# Patient Record
Sex: MALE | NOT HISPANIC OR LATINO | Employment: FULL TIME | URBAN - METROPOLITAN AREA
[De-identification: names, ages, dates, MRNs, and addresses within clinical notes are randomized per-mention and may not be internally consistent; named-entity substitution may affect disease eponyms.]

---

## 2017-01-17 ENCOUNTER — OFFICE VISIT (OUTPATIENT)
Dept: INTERNAL MEDICINE | Facility: CLINIC | Age: 22
End: 2017-01-17
Payer: COMMERCIAL

## 2017-01-17 VITALS
HEIGHT: 64 IN | OXYGEN SATURATION: 98 % | BODY MASS INDEX: 28.15 KG/M2 | TEMPERATURE: 97.1 F | SYSTOLIC BLOOD PRESSURE: 112 MMHG | WEIGHT: 164.9 LBS | DIASTOLIC BLOOD PRESSURE: 70 MMHG | RESPIRATION RATE: 16 BRPM | HEART RATE: 86 BPM

## 2017-01-17 DIAGNOSIS — R63.1 POLYDIPSIA: ICD-10-CM

## 2017-01-17 DIAGNOSIS — R35.89 POLYURIA: Primary | ICD-10-CM

## 2017-01-17 DIAGNOSIS — F41.1 ANXIETY STATE: ICD-10-CM

## 2017-01-17 DIAGNOSIS — K21.9 GASTROESOPHAGEAL REFLUX DISEASE WITHOUT ESOPHAGITIS: ICD-10-CM

## 2017-01-17 DIAGNOSIS — J06.9 VIRAL URI: ICD-10-CM

## 2017-01-17 LAB
ALBUMIN SERPL-MCNC: 4.3 G/DL (ref 3.4–5)
ALBUMIN UR-MCNC: NEGATIVE MG/DL
ALP SERPL-CCNC: 75 U/L (ref 40–150)
ALT SERPL W P-5'-P-CCNC: 32 U/L (ref 0–70)
ANION GAP SERPL CALCULATED.3IONS-SCNC: 8 MMOL/L (ref 3–14)
APPEARANCE UR: CLEAR
AST SERPL W P-5'-P-CCNC: 16 U/L (ref 0–45)
BILIRUB SERPL-MCNC: 0.4 MG/DL (ref 0.2–1.3)
BILIRUB UR QL STRIP: NEGATIVE
BUN SERPL-MCNC: 9 MG/DL (ref 7–30)
CALCIUM SERPL-MCNC: 9.4 MG/DL (ref 8.5–10.1)
CHLORIDE SERPL-SCNC: 103 MMOL/L (ref 94–109)
CO2 SERPL-SCNC: 30 MMOL/L (ref 20–32)
COLOR UR AUTO: YELLOW
CREAT SERPL-MCNC: 0.88 MG/DL (ref 0.66–1.25)
ERYTHROCYTE [DISTWIDTH] IN BLOOD BY AUTOMATED COUNT: 11.7 % (ref 10–15)
GFR SERPL CREATININE-BSD FRML MDRD: NORMAL ML/MIN/1.7M2
GLUCOSE SERPL-MCNC: 91 MG/DL (ref 70–99)
GLUCOSE UR STRIP-MCNC: NEGATIVE MG/DL
HBA1C MFR BLD: 5.1 % (ref 4.3–6)
HCT VFR BLD AUTO: 48.1 % (ref 40–53)
HGB BLD-MCNC: 16.5 G/DL (ref 13.3–17.7)
HGB UR QL STRIP: NEGATIVE
KETONES UR STRIP-MCNC: NEGATIVE MG/DL
LEUKOCYTE ESTERASE UR QL STRIP: NEGATIVE
MCH RBC QN AUTO: 30.6 PG (ref 26.5–33)
MCHC RBC AUTO-ENTMCNC: 34.3 G/DL (ref 31.5–36.5)
MCV RBC AUTO: 89 FL (ref 78–100)
NITRATE UR QL: NEGATIVE
PH UR STRIP: 6.5 PH (ref 5–7)
PLATELET # BLD AUTO: 310 10E9/L (ref 150–450)
POTASSIUM SERPL-SCNC: 4.3 MMOL/L (ref 3.4–5.3)
PROT SERPL-MCNC: 7.8 G/DL (ref 6.8–8.8)
RBC # BLD AUTO: 5.39 10E12/L (ref 4.4–5.9)
SODIUM SERPL-SCNC: 141 MMOL/L (ref 133–144)
SP GR UR STRIP: 1.02 (ref 1–1.03)
URN SPEC COLLECT METH UR: NORMAL
UROBILINOGEN UR STRIP-ACNC: 0.2 EU/DL (ref 0.2–1)
WBC # BLD AUTO: 5.2 10E9/L (ref 4–11)

## 2017-01-17 PROCEDURE — 83036 HEMOGLOBIN GLYCOSYLATED A1C: CPT | Performed by: INTERNAL MEDICINE

## 2017-01-17 PROCEDURE — 80053 COMPREHEN METABOLIC PANEL: CPT | Performed by: INTERNAL MEDICINE

## 2017-01-17 PROCEDURE — 36415 COLL VENOUS BLD VENIPUNCTURE: CPT | Performed by: INTERNAL MEDICINE

## 2017-01-17 PROCEDURE — 99214 OFFICE O/P EST MOD 30 MIN: CPT | Performed by: INTERNAL MEDICINE

## 2017-01-17 PROCEDURE — 81003 URINALYSIS AUTO W/O SCOPE: CPT | Performed by: INTERNAL MEDICINE

## 2017-01-17 PROCEDURE — 85027 COMPLETE CBC AUTOMATED: CPT | Performed by: INTERNAL MEDICINE

## 2017-01-17 NOTE — PATIENT INSTRUCTIONS
You can reach your [facility] Care Team any time of the day by calling [phone].  This number will put you in touch with the 24 hour nurse line even if the clinic is closed.  The clinic hours for [facility] are:  Monday through Thursday 7am to 6pm   Friday 7am to 5pm   Saturday 8am to 12p for Pediatrics only.    To  contact your  please call [phone].  This is a direct number for your care team during clinic hours.    [facility] is now open for your convenience:  Monday through Friday 7:30am to 7pm  Saturday and Sunday 9am to 3pm  They are closed on all major holidays.

## 2017-01-17 NOTE — NURSING NOTE
"Chief Complaint   Patient presents with     Mouth/Lip Problem     patient feels he may have thrush, tongue coated white.     Consult     excessive thirst, drinking 5 liters H@O everyday.     Cyst     right lower leg cyst.       Initial /70 mmHg  Pulse 86  Temp(Src) 97.1  F (36.2  C) (Oral)  Resp 16  Ht 5' 4\" (1.626 m)  Wt 164 lb 14.4 oz (74.798 kg)  BMI 28.29 kg/m2  SpO2 98% Estimated body mass index is 28.29 kg/(m^2) as calculated from the following:    Height as of this encounter: 5' 4\" (1.626 m).    Weight as of this encounter: 164 lb 14.4 oz (74.798 kg).  BP completed using cuff size: regular    "

## 2017-01-17 NOTE — PROGRESS NOTES
"  SUBJECTIVE:                                                    Jonas Hogan is a 21 year old male who presents to clinic today for the following health issues:  1.) mouth and lip/ feels he has thrush.  2.) GERD  3.) Consult regarding daily excessive thirst.    Patient is seen for a follow up visit.  Concern for recent URI, had sore throat, cough, nasal congestion, mild HA. No fever. No SOB, CP. Improving. Had symptoms for 5 days.  Has h/o GERD on PPI  treatment. symptoms are controlled. No nausea, vomiting, heartburns, bloating.  Has h/o anxiety. On psychiatric medications. Controlled.   Concern for chronic excessive thirst, urine frequency. No h/o DM.       Problem list and histories reviewed & adjusted, as indicated.  Additional history: none    Problem list, Medication list, Allergies, and Medical/Social/Surgical histories reviewed in EPIC and updated as appropriate.    ROS:  Constitutional, HEENT, cardiovascular, pulmonary, gi and gu systems are negative, except as otherwise noted.    OBJECTIVE:                                                    /70 mmHg  Pulse 86  Temp(Src) 97.1  F (36.2  C) (Oral)  Resp 16  Ht 5' 4\" (1.626 m)  Wt 164 lb 14.4 oz (74.798 kg)  BMI 28.29 kg/m2  SpO2 98%  Body mass index is 28.29 kg/(m^2).  GENERAL: healthy, alert and no distress  HEENT: ANDREA, clear mucosa, whitish coated tongue, mild nasal congestion   NECK: no adenopathy, no asymmetry, masses, or scars and thyroid normal to palpation  RESP: lungs clear to auscultation - no rales, rhonchi or wheezes  CV: regular rate and rhythm, normal S1 S2, no S3 or S4, no murmur, click or rub, no peripheral edema and peripheral pulses strong  ABDOMEN: soft, nontender, no hepatosplenomegaly, no masses and bowel sounds normal  MS: no gross musculoskeletal defects noted, no edema         ASSESSMENT/PLAN:                                                      Problem List Items Addressed This Visit     Anxiety state    " Gastroesophageal reflux disease without esophagitis      Other Visit Diagnoses     Polyuria    -  Primary     Relevant Orders     *UA reflex to Microscopic and Culture ([facility] (except [place] and [place]) (Completed)     CBC with platelets (Completed)     Comprehensive metabolic panel (Completed)     Hemoglobin A1c (Completed)     Polydipsia         Relevant Orders     *UA reflex to Microscopic and Culture ([facility] and [facility] (except [place] and [place]) (Completed)     CBC with platelets (Completed)     Comprehensive metabolic panel (Completed)     Hemoglobin A1c (Completed)     Viral URI                ASSESS LAB   Cont treatment   URI, likely viral, improving   Assess for DM, possible medication related side effects     Follow-Up: with results     Preet Syed MD  [facility]

## 2017-03-14 ENCOUNTER — TELEPHONE (OUTPATIENT)
Dept: INTERNAL MEDICINE | Facility: CLINIC | Age: 22
End: 2017-03-14

## 2017-03-14 ENCOUNTER — APPOINTMENT (OUTPATIENT)
Dept: CT IMAGING | Facility: CLINIC | Age: 22
End: 2017-03-14
Attending: EMERGENCY MEDICINE
Payer: COMMERCIAL

## 2017-03-14 ENCOUNTER — HOSPITAL ENCOUNTER (EMERGENCY)
Facility: CLINIC | Age: 22
Discharge: HOME | End: 2017-03-14
Attending: EMERGENCY MEDICINE | Admitting: EMERGENCY MEDICINE
Payer: COMMERCIAL

## 2017-03-14 VITALS
DIASTOLIC BLOOD PRESSURE: 95 MMHG | HEART RATE: 80 BPM | HEIGHT: 66 IN | BODY MASS INDEX: 25.39 KG/M2 | RESPIRATION RATE: 20 BRPM | WEIGHT: 158 LBS | SYSTOLIC BLOOD PRESSURE: 135 MMHG | OXYGEN SATURATION: 100 % | TEMPERATURE: 98.6 F

## 2017-03-14 DIAGNOSIS — R10.31 ABDOMINAL PAIN, RIGHT LOWER QUADRANT: ICD-10-CM

## 2017-03-14 DIAGNOSIS — K59.00 CONSTIPATION, UNSPECIFIED CONSTIPATION TYPE: ICD-10-CM

## 2017-03-14 LAB
ALBUMIN UR-MCNC: NEGATIVE MG/DL
ANION GAP SERPL CALCULATED.3IONS-SCNC: 9 MMOL/L (ref 3–14)
APPEARANCE UR: CLEAR
BILIRUB UR QL STRIP: NEGATIVE
BUN SERPL-MCNC: 11 MG/DL (ref 7–30)
CALCIUM SERPL-MCNC: 9 MG/DL (ref 8.5–10.1)
CHLORIDE SERPL-SCNC: 102 MMOL/L (ref 94–109)
CO2 SERPL-SCNC: 28 MMOL/L (ref 20–32)
COLOR UR AUTO: COLORLESS
CREAT SERPL-MCNC: 0.92 MG/DL (ref 0.66–1.25)
ERYTHROCYTE [DISTWIDTH] IN BLOOD BY AUTOMATED COUNT: 11.1 % (ref 10–15)
GFR SERPL CREATININE-BSD FRML MDRD: NORMAL ML/MIN/1.7M2
GLUCOSE SERPL-MCNC: 91 MG/DL (ref 70–99)
GLUCOSE UR STRIP-MCNC: NEGATIVE MG/DL
HCT VFR BLD AUTO: 44.2 % (ref 40–53)
HGB BLD-MCNC: 15.5 G/DL (ref 13.3–17.7)
HGB UR QL STRIP: NEGATIVE
KETONES UR STRIP-MCNC: NEGATIVE MG/DL
LEUKOCYTE ESTERASE UR QL STRIP: NEGATIVE
LIPASE SERPL-CCNC: 169 U/L (ref 73–393)
MCH RBC QN AUTO: 31.3 PG (ref 26.5–33)
MCHC RBC AUTO-ENTMCNC: 35.1 G/DL (ref 31.5–36.5)
MCV RBC AUTO: 89 FL (ref 78–100)
MUCOUS THREADS #/AREA URNS LPF: PRESENT /LPF
NITRATE UR QL: NEGATIVE
PH UR STRIP: 7 PH (ref 5–7)
PLATELET # BLD AUTO: 313 10E9/L (ref 150–450)
POTASSIUM SERPL-SCNC: 3.8 MMOL/L (ref 3.4–5.3)
RBC # BLD AUTO: 4.96 10E12/L (ref 4.4–5.9)
RBC #/AREA URNS AUTO: <1 /HPF (ref 0–2)
SODIUM SERPL-SCNC: 139 MMOL/L (ref 133–144)
SP GR UR STRIP: 1 (ref 1–1.03)
URN SPEC COLLECT METH UR: ABNORMAL
UROBILINOGEN UR STRIP-MCNC: 0 MG/DL (ref 0–2)
WBC # BLD AUTO: 7.9 10E9/L (ref 4–11)
WBC #/AREA URNS AUTO: 1 /HPF (ref 0–2)

## 2017-03-14 PROCEDURE — 25000128 H RX IP 250 OP 636: Performed by: EMERGENCY MEDICINE

## 2017-03-14 PROCEDURE — 96375 TX/PRO/DX INJ NEW DRUG ADDON: CPT

## 2017-03-14 PROCEDURE — 83690 ASSAY OF LIPASE: CPT | Performed by: EMERGENCY MEDICINE

## 2017-03-14 PROCEDURE — 80048 BASIC METABOLIC PNL TOTAL CA: CPT | Performed by: EMERGENCY MEDICINE

## 2017-03-14 PROCEDURE — 99285 EMERGENCY DEPT VISIT HI MDM: CPT | Mod: 25

## 2017-03-14 PROCEDURE — 81001 URINALYSIS AUTO W/SCOPE: CPT | Performed by: EMERGENCY MEDICINE

## 2017-03-14 PROCEDURE — 96361 HYDRATE IV INFUSION ADD-ON: CPT

## 2017-03-14 PROCEDURE — 85027 COMPLETE CBC AUTOMATED: CPT | Performed by: EMERGENCY MEDICINE

## 2017-03-14 PROCEDURE — 96374 THER/PROPH/DIAG INJ IV PUSH: CPT

## 2017-03-14 PROCEDURE — 74176 CT ABD & PELVIS W/O CONTRAST: CPT

## 2017-03-14 RX ORDER — KETOROLAC TROMETHAMINE 15 MG/ML
15 INJECTION, SOLUTION INTRAMUSCULAR; INTRAVENOUS ONCE
Status: COMPLETED | OUTPATIENT
Start: 2017-03-14 | End: 2017-03-14

## 2017-03-14 RX ORDER — POLYETHYLENE GLYCOL 3350 17 G/17G
POWDER, FOR SOLUTION ORAL
Qty: 527 G | Refills: 0 | Status: SHIPPED | OUTPATIENT
Start: 2017-03-14 | End: 2022-12-29

## 2017-03-14 RX ORDER — ONDANSETRON 2 MG/ML
8 INJECTION INTRAMUSCULAR; INTRAVENOUS ONCE
Status: DISCONTINUED | OUTPATIENT
Start: 2017-03-14 | End: 2017-03-14 | Stop reason: HOSPADM

## 2017-03-14 RX ADMIN — KETOROLAC TROMETHAMINE 15 MG: 15 INJECTION, SOLUTION INTRAMUSCULAR; INTRAVENOUS at 18:35

## 2017-03-14 RX ADMIN — SODIUM CHLORIDE 1000 ML: 9 INJECTION, SOLUTION INTRAVENOUS at 18:35

## 2017-03-14 ASSESSMENT — ENCOUNTER SYMPTOMS
FEVER: 0
ABDOMINAL PAIN: 1
NAUSEA: 1
VOMITING: 0
DIARRHEA: 0
CONSTIPATION: 1

## 2017-03-14 NOTE — ED AVS SNAPSHOT
[facility]    [address] [facility] [address]    Phone:  [phone]    Fax:  [phone]                                       Jonas Hogan   MRN: [id number]    Department:  [facility]   Date of Visit:  3/14/2017           Patient Information     Date Of Birth          1995        Your diagnoses for this visit were:     Constipation, unspecified constipation type     Abdominal pain, right lower quadrant        You were seen by Tong Oliveira MD.      Follow-up Information     Follow up with Preet Syed MD. Schedule an appointment as soon as possible for a visit in 1 week.    Specialty:  Internal Medicine    Why:  As needed    Contact information:    [facility]  [address] [facility] [address]  [phone]          Discharge Instructions       Discharge Instructions  Constipation  Your doctor has diagnosed you with constipation. Constipation can cause severe cramping pain and your physician thinks this is the cause of your abdominal pain today.  People usually recognize that they are constipated because they have difficulty having bowel movements, are not having bowel movements frequently enough, or are not having large enough bowel movements. Sometimes, especially in children or older people, you do not recognize that you are constipated until it becomes severe. The most common cause of constipation is a combination of lack of exercise and not eating enough fruits, vegetables and whole grains. Constipation can also be a side effect of medications, such as narcotics, or may be caused by a disease of the digestive system.    Return to the Emergency Department if:    Your abdominal pain worsens or does not improve after a bowel movement.    You become very weak.    You get an oral temperature above 102oF or as directed by your doctor.    You have blood in your stools (bright red or  black, tarry stools).    You keep throwing up or can t drink liquids.    Your see blood when you throw up.    Your stomach gets bloated or bigger.    You have new symptoms or anything that worries you.    What can I do to help myself?    If your doctor gave you a cathartic medication, like magnesium citrate or GoLytely  (polyethylene glycol), you can expect to have cramps and gas pains after taking it. You can expect to have a number of bowel movements and even diarrhea in the course of clearing your bowels.  You will know your bowels have been cleaned out after you pass clear liquid. The cramps and gas should let up after you have emptied your bowels. You may want to wait until morning to take this type of medication so you aren t up in the night.     Sometimes instead of cathartics, we recommend laxatives like milk of magnesia to move your bowels more slowly, or an enema to help the bowels to move. Read and follow the package directions, or follow your physician s instructions.    Once you have become very constipated, it takes time for your bowels to return to normal and you need to be very careful to prevent becoming constipated again. Take a laxative if you don t move your bowels at least every two days.       Eat foods that have a lot of fiber. Good choices are fruits, vegetables, prune juice, apple juice and high fiber cereal. Limit dairy products such as milk and cheese, since these can make constipation worse.     Drink plenty of water and other fluids.     When you feel the need to go to the bathroom, go to the bathroom. Don t hold it.    Miralax , Metamucil , Colace , Senna or fiber supplements can be used daily.  Miralax  daily is often the best choice for children.    FOLLOW UP WITH YOUR REGULAR DOCTOR IF YOUR CONSTIPATION IS NOT IMPROVING.  Sometimes, chronic constipation requires further testing to determine the cause. If you are over 50 years old, you may need a colonoscopy if you have not had one  before.   If you were given a prescription for medicine here today, be sure to read all of the information (including the package insert) that comes with your prescription.  This will include important information about the medicine, its side effects, and any warnings that you need to know about.  The pharmacist who fills the prescription can provide more information and answer questions you may have about the medicine.  If you have questions or concerns that the pharmacist cannot address, please call or return to the Emergency Department.   Discharge Instructions  Abdominal Pain    Abdominal pain can be caused by many things. Your evaluation today does not show the exact cause for your pain. Your doctor today has decided that it is unlikely your pain is due to a life threatening problem, or a problem requiring surgery or hospital admission. Sometimes those problems cannot be found right away, so it is very important that you follow up as directed.  Sometimes only the changes which occur over time allow the cause of your pain to be found.    Return to the Emergency Department for a recheck in 8-12 hours if your pain continues.  If your pain gets worse, changes in location, or feels different, return to the Emergency Department right away.    ADULTS:  Return to the Emergency Department right away if:      You get an oral temperature above 102oF or as directed by your doctor.    You have blood in your stools (bright red or black, tarry stools).    You keep throwing up or can t drink liquids.    You see blood when you throw up.    You can t have a bowel movement or you can t pass gas.    Your stomach gets bloated or bigger.    Your skin or the whites of your eyes look yellow.    You faint.    You have bloody, frequent or painful urination.    You have new symptoms or anything that worries you.    CHILDREN:  Return to the Emergency Department right away if your child has any of the above-listed symptoms or the  following:      Pushes your hand away or screams/cries when his/her belly is touched.    You notice your child is very fussy or weak.    Your child is very tired and is too tired to eat or drink.    Your child is dehydrated.  Signs of dehydration can be:  o Your infant has had no wet diapers in 4-5 hours.  o Your older child has not passed urine in 6-8 hours.  o Your infant or child starts to have dry mouth and lips, or no saliva or tears.    PREGNANT WOMEN:  Return to the Emergency Department right away if you have any of the above-listed symptoms or the following:      You have bleeding, leaking fluid or passing tissue from the vagina.    You have worse pain or cramping, or pain in your shoulder or back.    You have vomiting that will not stop.    You have painful or bloody urination.    You have a temperature of 100oF or more.    Your baby is not moving as much as usual.    You faint.    You get a bad headache with or without eye problems and abdominal pain.    You have a convulsion or seizure.    You have unusual discharge from your vagina and abdominal pain.    Abdominal pain is pretty common during pregnancy.  Your pain may or may not be related to your pregnancy. You should follow-up closely with your OB doctor so they can evaluate you and your baby.  Until you follow-up with your regular doctor, do the following:       Avoid sex and do not put anything in your vagina.    Drink clear fluids.    Only take medications approved by your doctor.    MORE INFORMATION:    Appendicitis:  A possible cause of abdominal pain in any person who still has their appendix is acute appendicitis. Appendicitis is often hard to diagnose.  Testing does not always rule out early appendicitis or other causes of abdominal pain. Close follow-up with your doctor and re-evaluations may be needed to figure out the reason for your abdominal pain.    Follow-up:  It is very important that you make an appointment with your clinic and go to  "the appointment.  If you do not follow-up with your primary doctor, it may result in missing an important development which could result in permanent injury or disability and/or lasting pain.  If there is any problem keeping your appointment, call your doctor or return to the Emergency Department.    Medications:  Take your medications as directed by your doctor today.  Before using over-the-counter medications, ask your doctor and make sure to take the medications as directed.  If you have any questions about medications, ask your doctor.    Diet:  Resume your normal diet as much as possible, but do not eat fried, fatty or spicy foods while you have pain.  Do not drink alcohol or have caffeine.  Do not smoke tobacco.    Probiotics: If you have been given an antibiotic, you may want to also take a probiotic pill or eat yogurt with live cultures. Probiotics have \"good bacteria\" to help your intestines stay healthy. Studies have shown that probiotics help prevent diarrhea and other intestine problems (including C. diff infection) when you take antibiotics. You can buy these without a prescription in the pharmacy section of the store.     If you were given a prescription for medicine here today, be sure to read all of the information (including the package insert) that comes with your prescription.  This will include important information about the medicine, its side effects, and any warnings that you need to know about.  The pharmacist who fills the prescription can provide more information and answer questions you may have about the medicine.  If you have questions or concerns that the pharmacist cannot address, please call or return to the Emergency Department.             24 Hour Appointment Hotline       To make an appointment at any [facility] call [phone] ([phone]). If you don't have a family doctor or clinic, we will help you find one. [facility] are conveniently located to serve the " needs of you and your family.             Review of your medicines      START taking        Dose / Directions Last dose taken    polyethylene glycol powder   Commonly known as:  MIRALAX   Quantity:  527 g        17 grams PO TID until loose stool then 17 grams PO daily PRN constipation   Refills:  0          Our records show that you are taking the medicines listed below. If these are incorrect, please call your family doctor or clinic.        Dose / Directions Last dose taken    CITALOPRAM HYDROBROMIDE PO        Refills:  0        CONCERTA 54 MG CR tablet   Dose:  54 mg   Quantity:  30 tablet   Generic drug:  methylphenidate ER        Take 1 tablet (54 mg) by mouth every morning   Refills:  0        HYDROXYZINE HCL PO        Refills:  0        mirtazapine 15 MG tablet   Commonly known as:  REMERON   Dose:  45 mg        Take 45 mg by mouth At Bedtime   Refills:  0        PRILOSEC OTC PO   Dose:  20 mg        Take 20 mg by mouth   Refills:  0                Prescriptions were sent or printed at these locations (1 Prescription)                   Other Prescriptions                Printed at Department/Unit printer (1 of 1)         polyethylene glycol (MIRALAX) powder                Procedures and tests performed during your visit     Abd/pelvis CT no contrast - [facility] Protocol    Basic metabolic panel (BMP)    CBC (platelets, no diff)    Lipase    UA with Microscopic      Orders Needing Specimen Collection     None      Pending Results     No orders found from 3/12/2017 to 3/15/2017.            Pending Culture Results     No orders found from 3/12/2017 to 3/15/2017.             Test Results from your hospital stay     3/14/2017  6:54 PM - Interface, [employer] Results      Component Results     Component Value Ref Range & Units Status    Color Urine Colorless  Final    Appearance Urine Clear  Final    Glucose Urine Negative NEG mg/dL Final    Bilirubin Urine Negative NEG Final    Ketones Urine Negative NEG mg/dL Final     Specific Gravity Urine 1.003 1.003 - 1.035 Final    Blood Urine Negative NEG Final    pH Urine 7.0 5.0 - 7.0 pH Final    Protein Albumin Urine Negative NEG mg/dL Final    Urobilinogen mg/dL 0.0 0.0 - 2.0 mg/dL Final    Nitrite Urine Negative NEG Final    Leukocyte Esterase Urine Negative NEG Final    Source Midstream Urine  Final    WBC Urine 1 0 - 2 /HPF Final    RBC Urine <1 0 - 2 /HPF Final    Mucous Urine Present (A) NEG /LPF Final         3/14/2017  6:14 PM - Interface, [employer] Results      Component Results     Component Value Ref Range & Units Status    WBC 7.9 4.0 - 11.0 10e9/L Final    RBC Count 4.96 4.4 - 5.9 10e12/L Final    Hemoglobin 15.5 13.3 - 17.7 g/dL Final    Hematocrit 44.2 40.0 - 53.0 % Final    MCV 89 78 - 100 fl Final    MCH 31.3 26.5 - 33.0 pg Final    MCHC 35.1 31.5 - 36.5 g/dL Final    RDW 11.1 10.0 - 15.0 % Final    Platelet Count 313 150 - 450 10e9/L Final         3/14/2017  6:27 PM - Interface, [employer] Results      Component Results     Component Value Ref Range & Units Status    Sodium 139 133 - 144 mmol/L Final    Potassium 3.8 3.4 - 5.3 mmol/L Final    Chloride 102 94 - 109 mmol/L Final    Carbon Dioxide 28 20 - 32 mmol/L Final    Anion Gap 9 3 - 14 mmol/L Final    Glucose 91 70 - 99 mg/dL Final    Urea Nitrogen 11 7 - 30 mg/dL Final    Creatinine 0.92 0.66 - 1.25 mg/dL Final    GFR Estimate >90  Non  GFR Calc   >60 mL/min/1.7m2 Final    GFR Estimate If Black >90   GFR Calc   >60 mL/min/1.7m2 Final    Calcium 9.0 8.5 - 10.1 mg/dL Final         3/14/2017  7:14 PM - Interface, Radiant Ib      Narrative     CT ABDOMEN AND PELVIS WITHOUT CONTRAST 3/14/2017 7:02 PM     HISTORY: Right flank/right lower quadrant pain     TECHNIQUE: Axial images are obtained from the lung bases to the  symphysis without oral or IV contrast. Coronal reformatted images are  also generated.  Radiation dose for this scan was reduced using  automated exposure control, adjustment of  the mA and/or kV according  to patient size, or iterative reconstruction technique.    FINDINGS: The lung bases are clear.    Abdomen: No urinary tract calculi or hydronephrosis. The liver,  spleen, contracted gallbladder and adrenal glands are unremarkable.  The pancreas is within normal limits. Questionable minimal  inflammation near the head of the pancreas near the kailey hepatis. No  enlarged lymph nodes. The bowel is normal in caliber without  obstruction. The appendix where visualized appears normal. No  pericecal inflammation is evident.    Pelvis: The bladder, prostate and rectum are unremarkable. No enlarged  pelvic lymph nodes or free pelvic fluid. Bone window examination is  within normal limits.        Impression     IMPRESSION: No urinary tract calculi or hydronephrosis. No bowel  obstruction, diverticulitis or appendicitis. Possible mild  inflammation near the head of the pancreas. Correlate with pancreatic  enzymes to exclude the possibility of pancreatitis. No ascites is  appreciated.    HERNAN SETHI MD         3/14/2017  7:23 PM - Interface, [employer] Results      Component Results     Component Value Ref Range & Units Status    Lipase 169 73 - 393 U/L Final                Clinical Quality Measure: Blood Pressure Screening     Your blood pressure was checked while you were in the emergency department today. The last reading we obtained was  BP: (!) 148/92 . Please read the guidelines below about what these numbers mean and what you should do about them.  If your systolic blood pressure (the top number) is less than 120 and your diastolic blood pressure (the bottom number) is less than 80, then your blood pressure is normal. There is nothing more that you need to do about it.  If your systolic blood pressure (the top number) is 120-139 or your diastolic blood pressure (the bottom number) is 80-89, your blood pressure may be higher than it should be. You should have your blood pressure rechecked within  a year by a primary care provider.  If your systolic blood pressure (the top number) is 140 or greater or your diastolic blood pressure (the bottom number) is 90 or greater, you may have high blood pressure. High blood pressure is treatable, but if left untreated over time it can put you at risk for heart attack, stroke, or kidney failure. You should have your blood pressure rechecked by a primary care provider within the next 4 weeks.  If your provider in the emergency department today gave you specific instructions to follow-up with your doctor or provider even sooner than that, you should follow that instruction and not wait for up to 4 weeks for your follow-up visit.        Thank you for choosing [facility]       Thank you for choosing [facility] for your care. Our goal is always to provide you with excellent care. Hearing back from our patients is one way we can continue to improve our services. Please take a few minutes to complete the written survey that you may receive in the mail after you visit with us. Thank you!        [employer] Information     [employer] gives you secure access to your electronic health record. If you see a primary care provider, you can also send messages to your care team and make appointments. If you have questions, please call your primary care clinic.  If you do not have a primary care provider, please call [phone] and they will assist you.        Care EveryWhere ID     This is your Care EveryWhere ID. This could be used by other organizations to access your [facility] medical records  [id number]        After Visit Summary       This is your record. Keep this with you and show to your community pharmacist(s) and doctor(s) at your next visit.

## 2017-03-14 NOTE — ED AVS SNAPSHOT
[facility] Emergency Department    [address]    [facility] [address]    Phone:  [phone]    Fax:  [phone]                                       Jonas Hogan   MRN: [id number]    Department:  [facility] Emergency Department   Date of Visit:  3/14/2017           After Visit Summary Signature Page     I have received my discharge instructions, and my questions have been answered. I have discussed any challenges I see with this plan with the nurse or doctor.    ..........................................................................................................................................  Patient/Patient Representative Signature      ..........................................................................................................................................  Patient Representative Print Name and Relationship to Patient    ..................................................               ................................................  Date                                            Time    ..........................................................................................................................................  Reviewed by Signature/Title    ...................................................              ..............................................  Date                                                            Time

## 2017-03-14 NOTE — ED PROVIDER NOTES
"  History     Chief Complaint:  Abdominal Pain    HPI   Jonas Hogan is a 21 year old male with a history of GERD on prilosec who presents to the emergency department today for evaluation of abdominal pain. Mr. Hogan has been experiencing 4 day long history of right flank pain progressing to right lower quadrant and abdominal cramping radiating at times to groin region. Symptoms improved with time, but then worsened today while at work. The pain waxes and wanes. Patient also states feeling nauseated and constipated.  He states shift in position improves the pain, but states worsening when he sits still. No fevers, vomiting or diarrhea. Patient had a bowel movement earlier today.     Allergies:  Shrimp  Seasonal allergies    Medications:    Omeprazole  Concerta  Citalopram  Hydroxyzine  Remeron    Past Medical History:    ADHD  Anxiety  Asthma  Wheezing  RSV    Past Surgical History:    ENT surgery  EGD    Family History:    Allergies   Hypertension   Lipids   Depression   Anxiety Disorder   OCD  Substance Abuse   Allergies   Depression   Allergies   ADD  Asthma  C.A.D.    DIABETES    Cancer  Anxiety Disorder     Social History:  Marital Status:  Single [1]  Tobacco: Negative  Alcohol: Negative  Presents alone.   PCP: Preet Syed    Review of Systems   Constitutional: Negative for fever.   Gastrointestinal: Positive for abdominal pain, constipation and nausea. Negative for diarrhea and vomiting.   Genitourinary: Positive for testicular pain.   All other systems reviewed and are negative.    Physical Exam   First Vitals:  BP: (!) 148/92  Pulse: 80  Temp: 98.6  F (37  C)  Resp: 20  Height: 167.6 cm (5' 6\")  Weight: 71.7 kg (158 lb)  SpO2: 100 %    Physical Exam    Constitutional:  Pleasant, age appropriate.       Resting comfortably in the bed.  Eyes:    Conjunctiva normal  Neck:    Supple, no meningismus.     CV:     Regular rate and rhythm.      No murmurs, rubs or gallops.     No lower extremity " edema.  PULM:    Clear to auscultation bilateral.       No respiratory distress.      Good air exchange.  ABD:    Soft, non-distended.       Minimal tenderness in the RLQ.     Bowel sounds normal.     No pulsatile masses.       No rebound, guarding or rigidity.     No CVA tenderness.      No hepatosplenomegaly.  MSK:     No gross deformity to all four extremities.   LYMPH:   No cervical lymphadenopathy.  NEURO:   Alert.  Good muscular tone, no atrophy.      Strength is equal and symmetric.  Skin:    Warm, dry and intact.    Psych:    Mood is good and affect is appropriate.      Emergency Department Course   Imaging:  Radiographic findings were communicated with the patient who voiced understanding of the findings.  CT Abdomen/Pelvis without contrast - Stone Protocol:  No urinary tract calculi or hydronephrosis. No bowel obstruction, diverticulitis or appendicitis. Possible mild inflammation near the head of the pancreas. Correlate with pancreatic enzymes to exclude the possibility of pancreatitis. No ascites is appreciated.  Final reading per radiology.    Laboratory:  CBC:  WBC 7.9, HGB 15.5, , otherwise WNL  BMP:  WNL. (Creatinine 0.92)    UA: Mucous present (A). WBC/HPF 1, RBC/HPF <1.     Interventions:  18:35 Normal saline 1,000mL IV   18:35 Toradol 30 mg, IV  19:25 Zofran 8 mg IV     Emergency Department Course:  Nursing notes and vitals reviewed.    17:34 I performed an exam of the patient as documented above.     17:57 Blood drawn. This was sent to the lab for further testing, results above.    The patient was taken for CT scan, see imaging results above.     19:21 Recheck patient. I personally reviewed the laboratory and imaging results with the Patient and answered all related questions prior to discharge. Findings and plan explained to the Patient. Patient discharged home with instructions regarding supportive care, medications, and reasons to return. The importance of close follow-up was  reviewed.  Impression & Plan    Medical Decision Making:  Jonas Hogan is a 21 year old male presents to the ED with right flank pain radiating to the right lower quadrant. Overall, he appears well. I felt this may represent renal colic versus constipation. My suspicion is high enough for ureteral colic to undergo advanced imaging. CT scan is negative for ureteral colic and appendicitis. Radiologist raised suspicion for mild pancreatitis, but not clinically relevant and lipase in within normal limits. Symptoms are likely caused by constipation and less likely viral illness, IBS, IBD, or peptic ulcer disease. He will be discharged home with Miralax with follow up with PCP if symptoms have not improved.     Diagnosis:    ICD-10-CM    1. Constipation, unspecified constipation type K59.00 Lipase     Lipase     CANCELED: Lipase   2. Abdominal pain, right lower quadrant R10.31        Disposition:  discharged to home    Discharge Medications:  Discharge Medication List as of 3/14/2017  7:26 PM      START taking these medications    Details   polyethylene glycol (MIRALAX) powder 17 grams PO TID until loose stool then 17 grams PO daily PRN constipation, Disp-527 g, R-0, Local Print             Scribe Disclosure:  I, La Geller, am serving as a scribe at 5:34 PM on 3/14/2017 to document services personally performed by Tong Oliveira MD, based on my observations and the provider's statements to me.  La Geller  3/14/2017   [facility] EMERGENCY DEPARTMENT       Tong Oliveira MD  03/14/17 2002

## 2017-03-14 NOTE — TELEPHONE ENCOUNTER
Patient complains of pain along R side of torso constant since 3/11/17 a.m.  Patient denies fever,redness, vomiting, change urination.  States he has felt constipated though did have BM today.  Has had nausea with decreased appetite.  Rates pain 4/10, worsens with movement.  No openings today in clinic, offered to look for openings at other [facility] clinics but pt declines stating he will go to [facility] later today to be checked out.  GLENROY Ferguson R.N.

## 2017-03-14 NOTE — ED NOTES
"Pt has had right flank and RLQ pain since Saturday. Pain has gotten worse over time. Pain is described as \"cramping\". Pt has been constipated since Saturday as well. No blood in urine or stool.   "

## 2017-03-15 NOTE — DISCHARGE INSTRUCTIONS
Discharge Instructions  Constipation  Your doctor has diagnosed you with constipation. Constipation can cause severe cramping pain and your physician thinks this is the cause of your abdominal pain today.  People usually recognize that they are constipated because they have difficulty having bowel movements, are not having bowel movements frequently enough, or are not having large enough bowel movements. Sometimes, especially in children or older people, you do not recognize that you are constipated until it becomes severe. The most common cause of constipation is a combination of lack of exercise and not eating enough fruits, vegetables and whole grains. Constipation can also be a side effect of medications, such as narcotics, or may be caused by a disease of the digestive system.    Return to the Emergency Department if:    Your abdominal pain worsens or does not improve after a bowel movement.    You become very weak.    You get an oral temperature above 102oF or as directed by your doctor.    You have blood in your stools (bright red or black, tarry stools).    You keep throwing up or can t drink liquids.    Your see blood when you throw up.    Your stomach gets bloated or bigger.    You have new symptoms or anything that worries you.    What can I do to help myself?    If your doctor gave you a cathartic medication, like magnesium citrate or GoLytely  (polyethylene glycol), you can expect to have cramps and gas pains after taking it. You can expect to have a number of bowel movements and even diarrhea in the course of clearing your bowels.  You will know your bowels have been cleaned out after you pass clear liquid. The cramps and gas should let up after you have emptied your bowels. You may want to wait until morning to take this type of medication so you aren t up in the night.     Sometimes instead of cathartics, we recommend laxatives like milk of magnesia to move your bowels more slowly, or an enema to  help the bowels to move. Read and follow the package directions, or follow your physician s instructions.    Once you have become very constipated, it takes time for your bowels to return to normal and you need to be very careful to prevent becoming constipated again. Take a laxative if you don t move your bowels at least every two days.       Eat foods that have a lot of fiber. Good choices are fruits, vegetables, prune juice, apple juice and high fiber cereal. Limit dairy products such as milk and cheese, since these can make constipation worse.     Drink plenty of water and other fluids.     When you feel the need to go to the bathroom, go to the bathroom. Don t hold it.    Miralax , Metamucil , Colace , Senna or fiber supplements can be used daily.  Miralax  daily is often the best choice for children.    FOLLOW UP WITH YOUR REGULAR DOCTOR IF YOUR CONSTIPATION IS NOT IMPROVING.  Sometimes, chronic constipation requires further testing to determine the cause. If you are over 50 years old, you may need a colonoscopy if you have not had one before.   If you were given a prescription for medicine here today, be sure to read all of the information (including the package insert) that comes with your prescription.  This will include important information about the medicine, its side effects, and any warnings that you need to know about.  The pharmacist who fills the prescription can provide more information and answer questions you may have about the medicine.  If you have questions or concerns that the pharmacist cannot address, please call or return to the Emergency Department.   Discharge Instructions  Abdominal Pain    Abdominal pain can be caused by many things. Your evaluation today does not show the exact cause for your pain. Your doctor today has decided that it is unlikely your pain is due to a life threatening problem, or a problem requiring surgery or hospital admission. Sometimes those problems cannot be  found right away, so it is very important that you follow up as directed.  Sometimes only the changes which occur over time allow the cause of your pain to be found.    Return to the Emergency Department for a recheck in 8-12 hours if your pain continues.  If your pain gets worse, changes in location, or feels different, return to the Emergency Department right away.    ADULTS:  Return to the Emergency Department right away if:      You get an oral temperature above 102oF or as directed by your doctor.    You have blood in your stools (bright red or black, tarry stools).    You keep throwing up or can t drink liquids.    You see blood when you throw up.    You can t have a bowel movement or you can t pass gas.    Your stomach gets bloated or bigger.    Your skin or the whites of your eyes look yellow.    You faint.    You have bloody, frequent or painful urination.    You have new symptoms or anything that worries you.    CHILDREN:  Return to the Emergency Department right away if your child has any of the above-listed symptoms or the following:      Pushes your hand away or screams/cries when his/her belly is touched.    You notice your child is very fussy or weak.    Your child is very tired and is too tired to eat or drink.    Your child is dehydrated.  Signs of dehydration can be:  o Your infant has had no wet diapers in 4-5 hours.  o Your older child has not passed urine in 6-8 hours.  o Your infant or child starts to have dry mouth and lips, or no saliva or tears.    PREGNANT WOMEN:  Return to the Emergency Department right away if you have any of the above-listed symptoms or the following:      You have bleeding, leaking fluid or passing tissue from the vagina.    You have worse pain or cramping, or pain in your shoulder or back.    You have vomiting that will not stop.    You have painful or bloody urination.    You have a temperature of 100oF or more.    Your baby is not moving as much as usual.    You  "faint.    You get a bad headache with or without eye problems and abdominal pain.    You have a convulsion or seizure.    You have unusual discharge from your vagina and abdominal pain.    Abdominal pain is pretty common during pregnancy.  Your pain may or may not be related to your pregnancy. You should follow-up closely with your OB doctor so they can evaluate you and your baby.  Until you follow-up with your regular doctor, do the following:       Avoid sex and do not put anything in your vagina.    Drink clear fluids.    Only take medications approved by your doctor.    MORE INFORMATION:    Appendicitis:  A possible cause of abdominal pain in any person who still has their appendix is acute appendicitis. Appendicitis is often hard to diagnose.  Testing does not always rule out early appendicitis or other causes of abdominal pain. Close follow-up with your doctor and re-evaluations may be needed to figure out the reason for your abdominal pain.    Follow-up:  It is very important that you make an appointment with your clinic and go to the appointment.  If you do not follow-up with your primary doctor, it may result in missing an important development which could result in permanent injury or disability and/or lasting pain.  If there is any problem keeping your appointment, call your doctor or return to the Emergency Department.    Medications:  Take your medications as directed by your doctor today.  Before using over-the-counter medications, ask your doctor and make sure to take the medications as directed.  If you have any questions about medications, ask your doctor.    Diet:  Resume your normal diet as much as possible, but do not eat fried, fatty or spicy foods while you have pain.  Do not drink alcohol or have caffeine.  Do not smoke tobacco.    Probiotics: If you have been given an antibiotic, you may want to also take a probiotic pill or eat yogurt with live cultures. Probiotics have \"good bacteria\" to " help your intestines stay healthy. Studies have shown that probiotics help prevent diarrhea and other intestine problems (including C. diff infection) when you take antibiotics. You can buy these without a prescription in the pharmacy section of the store.     If you were given a prescription for medicine here today, be sure to read all of the information (including the package insert) that comes with your prescription.  This will include important information about the medicine, its side effects, and any warnings that you need to know about.  The pharmacist who fills the prescription can provide more information and answer questions you may have about the medicine.  If you have questions or concerns that the pharmacist cannot address, please call or return to the Emergency Department.

## 2017-04-28 ENCOUNTER — OFFICE VISIT (OUTPATIENT)
Dept: INTERNAL MEDICINE | Facility: CLINIC | Age: 22
End: 2017-04-28
Payer: COMMERCIAL

## 2017-04-28 VITALS
SYSTOLIC BLOOD PRESSURE: 128 MMHG | HEART RATE: 110 BPM | DIASTOLIC BLOOD PRESSURE: 72 MMHG | BODY MASS INDEX: 26.13 KG/M2 | WEIGHT: 162.6 LBS | TEMPERATURE: 98.1 F | OXYGEN SATURATION: 96 % | HEIGHT: 66 IN

## 2017-04-28 DIAGNOSIS — R63.1 POLYDIPSIA: Primary | ICD-10-CM

## 2017-04-28 LAB
OSMOLALITY SERPL: 287 MMOL/KG (ref 275–295)
OSMOLALITY UR: 84 MMOL/KG (ref 100–1200)

## 2017-04-28 PROCEDURE — 36415 COLL VENOUS BLD VENIPUNCTURE: CPT | Performed by: INTERNAL MEDICINE

## 2017-04-28 PROCEDURE — 99213 OFFICE O/P EST LOW 20 MIN: CPT | Performed by: INTERNAL MEDICINE

## 2017-04-28 PROCEDURE — 83935 ASSAY OF URINE OSMOLALITY: CPT | Performed by: INTERNAL MEDICINE

## 2017-04-28 PROCEDURE — 83930 ASSAY OF BLOOD OSMOLALITY: CPT | Performed by: INTERNAL MEDICINE

## 2017-04-28 NOTE — PROGRESS NOTES
SUBJECTIVE:                                                    Jonas Hogan is a 21 year old male who presents to clinic today for the following health issues:    Pt is a 21 year old male who is seen here to day with c/o drinking excessive amount of water since 10/15, pt says he drinks about 9 lt of water/day, also urinates frequently as he is drinking lot of water. No urgency, no dysuria or incontinence   Pt saw his PCP and was checked for diabetes and had normal FBS- 91  and normal A1c 5.1 in 01/17. No f/h of diabetes.   Pt has h/o anxiety ,ADHD and on psychiatric meds. Sees Psychiatrist.        Patient Active Problem List   Diagnosis     Anxiety state     Attention deficit hyperactivity disorder (ADHD)     Migraine     CARDIOVASCULAR SCREENING; LDL GOAL LESS THAN 160     Gastroesophageal reflux disease without esophagitis     Non morbid obesity due to excess calories     ADHD (attention deficit hyperactivity disorder)     Past Surgical History:   Procedure Laterality Date     ENT SURGERY  2011    four wisdom teeth removed     ESOPHAGOSCOPY, GASTROSCOPY, DUODENOSCOPY (EGD), COMBINED N/A 5/13/2016    Procedure: COMBINED ESOPHAGOSCOPY, GASTROSCOPY, DUODENOSCOPY (EGD), BIOPSY SINGLE OR MULTIPLE;  Surgeon: Tristian Mayes MD;  Location:  GI       Social History   Substance Use Topics     Smoking status: Never Smoker     Smokeless tobacco: Never Used     Alcohol use No     Family History   Problem Relation Age of Onset     Allergies Mother      Hypertension Mother      Lipids Mother      Depression Mother      Anxiety Disorder Mother      MENTAL ILLNESS Mother      OCD     Substance Abuse Mother      Allergies Sister      Depression Sister      Anxiety Disorder Sister      Allergies Sister      MENTAL ILLNESS Sister      ADD, reactionary     MENTAL ILLNESS Father      picking, possibly aspergers     CANCER Maternal Grandfather      MENTAL ILLNESS Maternal Grandfather      OCD     MENTAL ILLNESS Paternal  "Grandmother      hoards     Anxiety Disorder Paternal Grandmother      Suicide Other      Substance Abuse Other      Bipolar Disorder Other      Intellectual Disability (Mental Retardation) Other      Autism Spectrum Disorder Other      Respiratory Sister      asthma     Anxiety Disorder Maternal Aunt      MENTAL ILLNESS Maternal Aunt      OCD     Anxiety Disorder Maternal Uncle      MENTAL ILLNESS Maternal Uncle      OCD     C.A.D. No family hx of      DIABETES No family hx of          Current Outpatient Prescriptions   Medication Sig Dispense Refill     Omeprazole Magnesium (PRILOSEC OTC PO) Take 20 mg by mouth       CONCERTA (BRAND NAME - NIDA) 54 MG CR tablet Take 1 tablet (54 mg) by mouth every morning 30 tablet 0     CITALOPRAM HYDROBROMIDE PO        HYDROXYZINE HCL PO        mirtazapine (REMERON) 15 MG tablet Take 45 mg by mouth At Bedtime        polyethylene glycol (MIRALAX) powder 17 grams PO TID until loose stool then 17 grams PO daily PRN constipation (Patient not taking: Reported on 4/28/2017) 527 g 0       Reviewed and updated as needed this visit by clinical staff  Tobacco  Allergies  Meds  Med Hx  Surg Hx  Fam Hx  Soc Hx      Reviewed and updated as needed this visit by Provider         ROS:  C: NEGATIVE for fever, chills, change in weight  E/M: NEGATIVE for ear, mouth and throat problems  R: NEGATIVE for significant cough or SOB  CV: NEGATIVE for chest pain, palpitations or peripheral edema  : frequency  ENDOCRINE: polydipsia/polyuira     OBJECTIVE:                                                    /72 (BP Location: Left arm, Patient Position: Chair, Cuff Size: Adult Regular)  Pulse 110  Temp 98.1  F (36.7  C) (Oral)  Ht 5' 6\" (1.676 m)  Wt 162 lb 9.6 oz (73.8 kg)  SpO2 96%  BMI 26.24 kg/m2  Body mass index is 26.24 kg/(m^2).   GENERAL: healthy, alert, well nourished, well hydrated, no distress  EYES: Eyes grossly normal to inspection, extraocular movements - intact, and " PERRL  HENT: ear canals- normal; TMs- normal; Nose- normal; Mouth- no ulcers, no lesions  NECK: no tenderness, no adenopathy, no asymmetry, no masses, no stiffness; thyroid- normal to palpation  RESP: lungs clear to auscultation - no rales, no rhonchi, no wheezes  CV: regular rates and rhythm, normal S1 S2,   ABDOMEN: soft, no tenderness, no  hepatosplenomegaly, no masses, normal bowel sounds  MS: extremities- no gross deformities noted, no edema  NEURO: strength and tone- normal, sensory exam- grossly normal, mentation- intact, speech- normal, reflexes- symmetric       ASSESSMENT/PLAN:                                                      1. Polydipsia  -- has normal blood sugar and normal A1c. ? Psychogenic Polydipsia/ Diabetes Insipidus   - ENDOCRINOLOGY ADULT REFERRAL- r/o Diabetes Insipidus.  - Osmolality urine  - Osmolality  pt was told I will contact him after results and proceed accordingly.       Margaret Chairez MD  [facility]

## 2017-04-28 NOTE — MR AVS SNAPSHOT
After Visit Summary   4/28/2017    Jonas Hogan    MRN: [id number]           Patient Information     Date Of Birth          1995        Visit Information        Provider Department      4/28/2017 2:20 PM Margaret Chairez MD [facility]        Today's Diagnoses     Polydipsia    -  1       Follow-ups after your visit        Additional Services     ENDOCRINOLOGY ADULT REFERRAL       Your provider has referred you to: [facility]: [facility] [phone]   [email or web address]      Please be aware that coverage of these services is subject to the terms and limitations of your health insurance plan.  Call member services at your health plan with any benefit or coverage questions.      Please bring the following to your appointment:    >>   Any x-rays, CTs or MRIs which have been performed.  Contact the facility where they were done to arrange for  prior to your scheduled appointment.    >>   List of current medications   >>   This referral request   >>   Any documents/labs given to you for this referral                  Your next 10 appointments already scheduled     May 03, 2017 10:00 AM CDT   New Visit with Mony Bonilla MD   [facility] ([facility])    [address] [facility] [address]  [facility] [address] [phone]              Who to contact     If you have questions or need follow up information about today's clinic visit or your schedule please contact [facility] directly at [phone].  Normal or non-critical lab and imaging results will be communicated to you by MyChart, letter or phone within 4 business days after the clinic has received the results. If you do not hear from us within 7 days, please contact the clinic through MyChart or phone. If you have a critical or abnormal lab result, we will notify you by phone as soon as possible.  Submit  "refill requests through [employer] or call your pharmacy and they will forward the refill request to us. Please allow 3 business days for your refill to be completed.          Additional Information About Your Visit        [employer] Information     [employer] gives you secure access to your electronic health record. If you see a primary care provider, you can also send messages to your care team and make appointments. If you have questions, please call your primary care clinic.  If you do not have a primary care provider, please call [phone] and they will assist you.        Care EveryWhere ID     This is your Care EveryWhere ID. This could be used by other organizations to access your [facility] medical records  [id number]        Your Vitals Were     Pulse Temperature Height Pulse Oximetry BMI (Body Mass Index)       110 98.1  F (36.7  C) (Oral) 5' 6\" (1.676 m) 96% 26.24 kg/m2        Blood Pressure from Last 3 Encounters:   04/28/17 128/72   03/14/17 (!) 135/95   01/17/17 112/70    Weight from Last 3 Encounters:   04/28/17 162 lb 9.6 oz (73.8 kg)   03/14/17 158 lb (71.7 kg)   01/17/17 164 lb 14.4 oz (74.8 kg)              We Performed the Following     ENDOCRINOLOGY ADULT REFERRAL     Osmolality urine     Osmolality        Primary Care Provider Office Phone # Fax #    Preet Syed [phone]       [facility] [address] [place] [address]        Thank you!     Thank you for choosing [facility]  for your care. Our goal is always to provide you with excellent care. Hearing back from our patients is one way we can continue to improve our services. Please take a few minutes to complete the written survey that you may receive in the mail after your visit with us. Thank you!             Your Updated Medication List - Protect others around you: Learn how to safely use, store and throw away your medicines at www.disposemymeds.org.          This list is accurate as " of: 4/28/17 11:59 PM.  Always use your most recent med list.                   Brand Name Dispense Instructions for use    CITALOPRAM HYDROBROMIDE PO          CONCERTA 54 MG CR tablet   Generic drug:  methylphenidate ER     30 tablet    Take 1 tablet (54 mg) by mouth every morning       HYDROXYZINE HCL PO          mirtazapine 15 MG tablet    REMERON     Take 45 mg by mouth At Bedtime       polyethylene glycol powder    MIRALAX    527 g    17 grams PO TID until loose stool then 17 grams PO daily PRN constipation       PRILOSEC OTC PO      Take 20 mg by mouth

## 2017-04-28 NOTE — NURSING NOTE
"Chief Complaint   Patient presents with     excessive drinking water     Pt noticed since october 2016 he has drinking water excessively       Initial /72 (BP Location: Left arm, Patient Position: Chair, Cuff Size: Adult Regular)  Pulse 110  Temp 98.1  F (36.7  C) (Oral)  Ht 5' 6\" (1.676 m)  Wt 162 lb 9.6 oz (73.8 kg)  SpO2 96%  BMI 26.24 kg/m2 Estimated body mass index is 26.24 kg/(m^2) as calculated from the following:    Height as of this encounter: 5' 6\" (1.676 m).    Weight as of this encounter: 162 lb 9.6 oz (73.8 kg).  Medication Reconciliation: complete   Mary Woodruff MA    "

## 2017-05-03 ENCOUNTER — OFFICE VISIT (OUTPATIENT)
Dept: ENDOCRINOLOGY | Facility: CLINIC | Age: 22
End: 2017-05-03
Payer: COMMERCIAL

## 2017-05-03 VITALS
WEIGHT: 164 LBS | SYSTOLIC BLOOD PRESSURE: 120 MMHG | HEART RATE: 113 BPM | BODY MASS INDEX: 26.36 KG/M2 | HEIGHT: 66 IN | OXYGEN SATURATION: 98 % | DIASTOLIC BLOOD PRESSURE: 82 MMHG | TEMPERATURE: 98.3 F

## 2017-05-03 DIAGNOSIS — R63.1 POLYDIPSIA: Primary | ICD-10-CM

## 2017-05-03 DIAGNOSIS — E66.09 NON MORBID OBESITY DUE TO EXCESS CALORIES: ICD-10-CM

## 2017-05-03 LAB
CORTIS SERPL-MCNC: 14.1 UG/DL (ref 4–22)
FSH SERPL-ACNC: 2.9 IU/L (ref 0.7–10.8)
LH SERPL-ACNC: 5.8 IU/L (ref 1.5–9.3)
OSMOLALITY SERPL: 310 MMOL/KG (ref 275–295)
OSMOLALITY UR: 209 MMOL/KG (ref 100–1200)
PROLACTIN SERPL-MCNC: 8 UG/L (ref 2–18)

## 2017-05-03 PROCEDURE — 84443 ASSAY THYROID STIM HORMONE: CPT | Performed by: INTERNAL MEDICINE

## 2017-05-03 PROCEDURE — 80048 BASIC METABOLIC PNL TOTAL CA: CPT | Performed by: INTERNAL MEDICINE

## 2017-05-03 PROCEDURE — 99244 OFF/OP CNSLTJ NEW/EST MOD 40: CPT | Performed by: INTERNAL MEDICINE

## 2017-05-03 PROCEDURE — 36415 COLL VENOUS BLD VENIPUNCTURE: CPT | Performed by: INTERNAL MEDICINE

## 2017-05-03 PROCEDURE — 84305 ASSAY OF SOMATOMEDIN: CPT | Performed by: INTERNAL MEDICINE

## 2017-05-03 PROCEDURE — 83002 ASSAY OF GONADOTROPIN (LH): CPT | Performed by: INTERNAL MEDICINE

## 2017-05-03 PROCEDURE — 83935 ASSAY OF URINE OSMOLALITY: CPT | Performed by: INTERNAL MEDICINE

## 2017-05-03 PROCEDURE — 82024 ASSAY OF ACTH: CPT | Performed by: INTERNAL MEDICINE

## 2017-05-03 PROCEDURE — 83001 ASSAY OF GONADOTROPIN (FSH): CPT | Performed by: INTERNAL MEDICINE

## 2017-05-03 PROCEDURE — 84146 ASSAY OF PROLACTIN: CPT | Performed by: INTERNAL MEDICINE

## 2017-05-03 PROCEDURE — 83930 ASSAY OF BLOOD OSMOLALITY: CPT | Performed by: INTERNAL MEDICINE

## 2017-05-03 PROCEDURE — 82533 TOTAL CORTISOL: CPT | Performed by: INTERNAL MEDICINE

## 2017-05-03 NOTE — PROGRESS NOTES
Name: Jonas Hogan  Seen at the request of Preet Syed for   Chief Complaint   Patient presents with     Referral     Dr garcia for polydipsia       HPI:  Jonas Hogan is a 21 year old male who presents for the evaluation of  .  H/o ADD- on methylphenidate- concerta  Started job in oct 2015.  Since Oct 2015- thinks that he is drinking a lot of water about 8-9 L.  Drinking to thirst.  H/o GERD- acid reflux  Not getting worse.  No h/o head trauma.  Not waking up at night to pee. Not waking up at night feeling thirsty.  No change in vision  No HA  H/o migraine in past with tunnel vision X 10 years back  Labs showed normal sodium, serum osmolarity and low urine osmolarity.    PMH/PSH:  Past Medical History:   Diagnosis Date     ADHD (attention deficit hyperactivity disorder)      Anxiety state, unspecified      Asthma     triggered by seasonal allergies     Polydipsia 5/3/2017     Respiratory syncytial virus (RSV)      Wheezing      Past Surgical History:   Procedure Laterality Date     ENT SURGERY  2011    four wisdom teeth removed     ESOPHAGOSCOPY, GASTROSCOPY, DUODENOSCOPY (EGD), COMBINED N/A 5/13/2016    Procedure: COMBINED ESOPHAGOSCOPY, GASTROSCOPY, DUODENOSCOPY (EGD), BIOPSY SINGLE OR MULTIPLE;  Surgeon: Tristian Mayes MD;  Location:  GI     Family Hx:  Family History   Problem Relation Age of Onset     Allergies Mother      Hypertension Mother      Lipids Mother      Depression Mother      Anxiety Disorder Mother      MENTAL ILLNESS Mother      OCD     Substance Abuse Mother      Allergies Sister      Depression Sister      Anxiety Disorder Sister      Allergies Sister      MENTAL ILLNESS Sister      ADD, reactionary     MENTAL ILLNESS Father      picking, possibly aspergers     CANCER Maternal Grandfather      MENTAL ILLNESS Maternal Grandfather      OCD     MENTAL ILLNESS Paternal Grandmother      hoards     Anxiety Disorder Paternal Grandmother      Suicide Other      Substance Abuse  "Other      Bipolar Disorder Other      Intellectual Disability (Mental Retardation) Other      Autism Spectrum Disorder Other      Respiratory Sister      asthma     Anxiety Disorder Maternal Aunt      MENTAL ILLNESS Maternal Aunt      OCD     Anxiety Disorder Maternal Uncle      MENTAL ILLNESS Maternal Uncle      OCD     C.A.D. No family hx of      DIABETES No family hx of             Social Hx:  Social History     Social History     Marital status: Single     Spouse name: N/A     Number of children: N/A     Years of education: N/A     Occupational History     Not on file.     Social History Main Topics     Smoking status: Never Smoker     Smokeless tobacco: Never Used     Alcohol use No     Drug use: No     Sexual activity: Not Currently     Partners: Female     Birth control/ protection: Condom     Other Topics Concern      Service No     Blood Transfusions No     Caffeine Concern No     Occupational Exposure No     Hobby Hazards No     Sleep Concern No     Stress Concern No     Weight Concern No     Special Diet No     Back Care No     Exercise No     Seat Belt Yes     Parent/Sibling W/ Cabg, Mi Or Angioplasty Before 65f 55m? Yes     maternal grandfather- 50, cousin born with heart problem     Social History Narrative          MEDICATIONS:  has a current medication list which includes the following prescription(s): polyethylene glycol, omeprazole magnesium, concerta, citalopram hydrobromide, hydroxyzine hcl, and mirtazapine.    ROS     ROS: 10 point ROS neg other than the symptoms noted above in the HPI.    Physical Exam   VS: /82 (BP Location: Left arm, Patient Position: Chair, Cuff Size: Adult Regular)  Pulse 113  Temp 98.3  F (36.8  C) (Oral)  Ht 1.676 m (5' 6\")  Wt 74.4 kg (164 lb)  SpO2 98%  BMI 26.47 kg/m2  GENERAL: AXOX3, NAD, well dressed, answering questions appropriately, appears stated age.  HEENT: NO exopthalmous, no proptosis, EOMI, no lig lag, no retraction  NECK: Thyroid normal " in size, non tender, no nodules were palpated.  CV: RRR, no rubs, gallops, no murmurs  LUNGS: CTAB, no wheezes, rales, or ronchi  ABDOMEN: +BS  EXTREMITIES: no edema, +pulses, no rashes, no lesions  NEUROLOGY: CN grossly intact, + DTR upper and lower extremity, no tremors  MSK: grossly intact  SKIN: no rashes, no lesions  PSYCH: normal affect and mood    LABS:  Last Basic Metabolic Panel:  Lab Results   Component Value Date     03/14/2017      Lab Results   Component Value Date    POTASSIUM 3.8 03/14/2017     Lab Results   Component Value Date    CHLORIDE 102 03/14/2017     Lab Results   Component Value Date    LUPE 9.0 03/14/2017     Lab Results   Component Value Date    CO2 28 03/14/2017     Lab Results   Component Value Date    BUN 11 03/14/2017     Lab Results   Component Value Date    CR 0.92 03/14/2017     Lab Results   Component Value Date    GLC 91 03/14/2017       TSH   Date Value Ref Range Status   04/16/2016 0.88 0.40 - 4.00 mU/L Final   ]      All pertinent notes, labs, and images personally reviewed by me.     A/P  Mr.Jackson Hogan is a 21 year old here for the evaluation of:    (R63.1) Polydipsia  (primary encounter diagnosis)  Comment: Differential includes psychogenic polydipsia, diabetes insipidus.  Labs showing normal sodium, serum was moderately and low urine O's modality and normal kidney function.  He is drinking about 8-9 L of water every day.  Drinking to thirst.  Will do following lab work up including 24-hour urine collection.  Consider what her depression test based on above  Plan: Basic metabolic panel, Prolactin, TSH with free        T4 reflex, Follicle stimulating hormone,         Insulin growth factor 1, Lutropin, Adrenal         corticotropin, Cortisol, Osmolality urine,         Osmolality, Creatinine clearance           More than 50% of face to face time spent with Mr. Hogan on counseling / coordinating his care.      All questions were answered.  The patient indicates  understanding of the above issues and agrees with the plan set forth.       Follow-up:  2-4 weeks    Mony Bonilla MD  Endocrinology   [facility]    CC: Preet Syed     Disclaimer: This note consists of symbols derived from keyboarding, dictation and/or voice recognition software. As a result, there may be errors in the script that have gone undetected. Please consider this when interpreting information found in this chart.    Addendum to above note and clinic visit:    Labs reviewed.    See result note/telephone encounter.

## 2017-05-03 NOTE — NURSING NOTE
"Chief Complaint   Patient presents with     Referral     Dr garcia for polydipsia        Initial /82 (BP Location: Left arm, Patient Position: Chair, Cuff Size: Adult Regular)  Pulse 113  Temp 98.3  F (36.8  C) (Oral)  Ht 1.676 m (5' 6\")  Wt 74.4 kg (164 lb)  SpO2 98%  BMI 26.47 kg/m2 Estimated body mass index is 26.47 kg/(m^2) as calculated from the following:    Height as of this encounter: 1.676 m (5' 6\").    Weight as of this encounter: 74.4 kg (164 lb).  Medication Reconciliation: complete       ENDOCRINOLOGY INTAKE FORM    Patient Name:  Jonas Hogan  :  1995    Is patient Diabetic?   No  Does patient have non-diabetic or other endocrine issues?  Yes: polydipsia     Vitals: /82 (BP Location: Left arm, Patient Position: Chair, Cuff Size: Adult Regular)  Pulse 113  Temp 98.3  F (36.8  C) (Oral)  Ht 1.676 m (5' 6\")  Wt 74.4 kg (164 lb)  SpO2 98%  BMI 26.47 kg/m2  BMI= Body mass index is 26.47 kg/(m^2).    Flu vaccine:  Yes: 16  Pneumonia vaccine:  No    Smoking and Alcohol use:  Social History   Substance Use Topics     Smoking status: Never Smoker     Smokeless tobacco: Never Used     Alcohol use No     Staff Signature:  Aliyah Culver CMA        "

## 2017-05-03 NOTE — MR AVS SNAPSHOT
After Visit Summary   5/3/2017    Jonas Hogan    MRN: [id number]           Patient Information     Date Of Birth          1995        Visit Information        Provider Department      5/3/2017 10:00 AM Mony Bonilla MD [facility]        Today's Diagnoses     Polydipsia    -  1    Non morbid obesity due to excess calories          Care Instructions    [facility] & [place] locations   Dr Bonilla, Endocrinology Department      [facility]   [address] [place] [address]  Appointment [phone]  Fax: [phone]  [place]: Monday and Tuesday         [facility]   [address] [facility] [place] [address]  Appointment [phone]  Fax: [phone]  [place]: Wednesday and Thursday            Labs today  24 Hour Urine Collection    - During a 24-hour urine collection, follow your usual diet and drink fluids as you ordinarily would.  - Avoid drinking alcohol before and during the urine collection.    - For a 24-hour urine collection, all of the urine that you pass over a 24-hour time period must be collected. You will receive a special container to collect the sample.    The following are directions for collecting a 24-hour urine sample while at home:    In the morning scheduled to begin the urine collection, urinate in the toilet and flush away the first urine you pass. Write down the date and time. That is the start date and time for the collection.    Collect all urine you pass, day and night, for 24 hours. Use the container given to you to collect the urine. Avoid using other containers. The urine sample must include the last urine that you pass 24 hours after starting the collection. Do not allow toilet paper, stool, or anything else to be added to the urine sample.    Write down the date and time that the last sample is collected.    Follow up in 1 week after above         "Follow-ups after your visit        Who to contact     If you have questions or need follow up information about today's clinic visit or your schedule please contact [facility] directly at [phone].  Normal or non-critical lab and imaging results will be communicated to you by MyChart, letter or phone within 4 business days after the clinic has received the results. If you do not hear from us within 7 days, please contact the clinic through [employer] or phone. If you have a critical or abnormal lab result, we will notify you by phone as soon as possible.  Submit refill requests through [employer] or call your pharmacy and they will forward the refill request to us. Please allow 3 business days for your refill to be completed.          Additional Information About Your Visit        [employer] Information     [employer] gives you secure access to your electronic health record. If you see a primary care provider, you can also send messages to your care team and make appointments. If you have questions, please call your primary care clinic.  If you do not have a primary care provider, please call [phone] and they will assist you.        Care EveryWhere ID     This is your Care EveryWhere ID. This could be used by other organizations to access your [facility] medical records  [id number]        Your Vitals Were     Pulse Temperature Height Pulse Oximetry BMI (Body Mass Index)       113 98.3  F (36.8  C) (Oral) 1.676 m (5' 6\") 98% 26.47 kg/m2        Blood Pressure from Last 3 Encounters:   05/03/17 120/82   04/28/17 128/72   03/14/17 (!) 135/95    Weight from Last 3 Encounters:   05/03/17 74.4 kg (164 lb)   04/28/17 73.8 kg (162 lb 9.6 oz)   03/14/17 71.7 kg (158 lb)              We Performed the Following     Adrenal corticotropin     Basic metabolic panel     Cortisol     Follicle stimulating hormone     Insulin growth factor 1     Lutropin     Osmolality urine     Osmolality     Prolactin     TSH with free " T4 reflex        Primary Care Provider Office Phone # Fax #    Preet Syed [phone]       [facility] [address] [facility] [address]        Thank you!     Thank you for choosing [facility]  for your care. Our goal is always to provide you with excellent care. Hearing back from our patients is one way we can continue to improve our services. Please take a few minutes to complete the written survey that you may receive in the mail after your visit with us. Thank you!             Your Updated Medication List - Protect others around you: Learn how to safely use, store and throw away your medicines at www.disposemymeds.org.          This list is accurate as of: 5/3/17 10:30 AM.  Always use your most recent med list.                   Brand Name Dispense Instructions for use    CITALOPRAM HYDROBROMIDE PO          CONCERTA 54 MG CR tablet   Generic drug:  methylphenidate ER     30 tablet    Take 1 tablet (54 mg) by mouth every morning       HYDROXYZINE HCL PO          mirtazapine 15 MG tablet    REMERON     Take 45 mg by mouth At Bedtime       polyethylene glycol powder    MIRALAX    527 g    17 grams PO TID until loose stool then 17 grams PO daily PRN constipation       PRILOSEC OTC PO      Take 20 mg by mouth

## 2017-05-03 NOTE — PATIENT INSTRUCTIONS
[facility] & [place] locations   Dr Bonilla, Endocrinology Department      [facility]   [address] [place] [address]  Appointment [phone]  Fax: [phone]  [place]: Monday and Tuesday         [facility]   [address] [place] [address]  Appointment [phone]  Fax: [phone]  [place]: Wednesday and Thursday            Labs today  24 Hour Urine Collection    - During a 24-hour urine collection, follow your usual diet and drink fluids as you ordinarily would.  - Avoid drinking alcohol before and during the urine collection.    - For a 24-hour urine collection, all of the urine that you pass over a 24-hour time period must be collected. You will receive a special container to collect the sample.    The following are directions for collecting a 24-hour urine sample while at home:    In the morning scheduled to begin the urine collection, urinate in the toilet and flush away the first urine you pass. Write down the date and time. That is the start date and time for the collection.    Collect all urine you pass, day and night, for 24 hours. Use the container given to you to collect the urine. Avoid using other containers. The urine sample must include the last urine that you pass 24 hours after starting the collection. Do not allow toilet paper, stool, or anything else to be added to the urine sample.    Write down the date and time that the last sample is collected.    Follow up in 1 week after above

## 2017-05-04 LAB
ACTH PLAS-MCNC: 22 PG/ML
ANION GAP SERPL CALCULATED.3IONS-SCNC: 11 MMOL/L (ref 3–14)
BUN SERPL-MCNC: 11 MG/DL (ref 7–30)
CALCIUM SERPL-MCNC: 9.4 MG/DL (ref 8.5–10.1)
CHLORIDE SERPL-SCNC: 105 MMOL/L (ref 94–109)
CO2 SERPL-SCNC: 26 MMOL/L (ref 20–32)
CREAT SERPL-MCNC: 0.76 MG/DL (ref 0.66–1.25)
GFR SERPL CREATININE-BSD FRML MDRD: NORMAL ML/MIN/1.7M2
GLUCOSE SERPL-MCNC: 91 MG/DL (ref 70–99)
POTASSIUM SERPL-SCNC: 4.2 MMOL/L (ref 3.4–5.3)
SODIUM SERPL-SCNC: 142 MMOL/L (ref 133–144)
TSH SERPL DL<=0.005 MIU/L-ACNC: 1.12 MU/L (ref 0.4–4)

## 2017-05-05 LAB — IGF-I BLD-MCNC: 289 NG/ML (ref 112–316)

## 2017-05-07 PROCEDURE — 82575 CREATININE CLEARANCE TEST: CPT | Performed by: INTERNAL MEDICINE

## 2017-05-10 ENCOUNTER — OFFICE VISIT (OUTPATIENT)
Dept: ENDOCRINOLOGY | Facility: CLINIC | Age: 22
End: 2017-05-10
Payer: COMMERCIAL

## 2017-05-10 VITALS
WEIGHT: 163.2 LBS | DIASTOLIC BLOOD PRESSURE: 72 MMHG | SYSTOLIC BLOOD PRESSURE: 122 MMHG | BODY MASS INDEX: 26.23 KG/M2 | HEART RATE: 108 BPM | HEIGHT: 66 IN | TEMPERATURE: 98.2 F | OXYGEN SATURATION: 99 %

## 2017-05-10 DIAGNOSIS — R63.1 POLYDIPSIA: ICD-10-CM

## 2017-05-10 DIAGNOSIS — E66.09 NON MORBID OBESITY DUE TO EXCESS CALORIES: ICD-10-CM

## 2017-05-10 DIAGNOSIS — R63.1 POLYDIPSIA: Primary | ICD-10-CM

## 2017-05-10 PROCEDURE — 99214 OFFICE O/P EST MOD 30 MIN: CPT | Performed by: INTERNAL MEDICINE

## 2017-05-10 PROCEDURE — 82565 ASSAY OF CREATININE: CPT | Performed by: INTERNAL MEDICINE

## 2017-05-10 PROCEDURE — 36415 COLL VENOUS BLD VENIPUNCTURE: CPT | Performed by: INTERNAL MEDICINE

## 2017-05-10 NOTE — Clinical Note
Need water deprivation test. It will be done at [facility]. [phone] Ask to make clinic appointment and let us know.

## 2017-05-10 NOTE — PATIENT INSTRUCTIONS
[facility] & [place] locations   Dr Bonilla, Endocrinology Department      [facility]   [address] [address]  Appointment [phone]  Fax: [phone]  [place]: Monday and Tuesday         [facility]   [address] [place] [address]  Appointment [phone]  Fax: [phone]  [place]: Wednesday and Thursday            Will need water restriction test with infusion center    Infusion center- [place] [facility].                [phone]   Infusion center- [facility].                [phone]     Please call infusion center to schedule the treatment/ test discussed.

## 2017-05-10 NOTE — NURSING NOTE
"Chief Complaint   Patient presents with     Consult     polydipsia follow up on labs         Initial /72 (BP Location: Left arm, Patient Position: Chair, Cuff Size: Adult Regular)  Pulse 108  Temp 98.2  F (36.8  C) (Oral)  Ht 1.676 m (5' 6\")  Wt 74 kg (163 lb 3.2 oz)  SpO2 99%  BMI 26.34 kg/m2 Estimated body mass index is 26.34 kg/(m^2) as calculated from the following:    Height as of this encounter: 1.676 m (5' 6\").    Weight as of this encounter: 74 kg (163 lb 3.2 oz).  Medication Reconciliation: complete     ENDOCRINOLOGY INTAKE FORM    Patient Name:  Jonas Hogan  :  1995    Is patient Diabetic?   No  Does patient have non-diabetic or other endocrine issues?  Yes: polydipsia     Vitals: /72 (BP Location: Left arm, Patient Position: Chair, Cuff Size: Adult Regular)  Pulse 108  Temp 98.2  F (36.8  C) (Oral)  Ht 1.676 m (5' 6\")  Wt 74 kg (163 lb 3.2 oz)  SpO2 99%  BMI 26.34 kg/m2  BMI= Body mass index is 26.34 kg/(m^2).    Flu vaccine:  Yes: 16  Pneumonia vaccine:  No    Smoking and Alcohol use:  Social History   Substance Use Topics     Smoking status: Never Smoker     Smokeless tobacco: Never Used     Alcohol use No   Staff Signature:  Aliyah Culver CMA        "

## 2017-05-10 NOTE — MR AVS SNAPSHOT
After Visit Summary   5/10/2017    Jonas Hogan    MRN: [id number]           Patient Information     Date Of Birth          1995        Visit Information        Provider Department      5/10/2017 11:00 AM Mony Bonilla MD [facility]        Today's Diagnoses     Polydipsia    -  1    Non morbid obesity due to excess calories          Care Instructions    [facility] & [place] locations   Dr Bonilla, Endocrinology Department      [facility]   [address] [place] [address]  Appointment [phone]  Fax: [phone]  [place]: Monday and Tuesday         [facility]   [address] [place] [address]  Appointment [phone]  Fax: [phone]  [place]: Wednesday and Thursday            Will need water restriction test with infusion center    Infusion center- [facility].                [phone]   Infusion center- [facility].                [phone]     Please call infusion center to schedule the treatment/ test discussed.                          Follow-ups after your visit        Your next 10 appointments already scheduled     May 10, 2017 11:30 AM CDT   LAB with [facility]   [facility] ([facility]    [address]  [facility] [address] [phone]           Patient must bring picture ID.  Patient should be prepared to give a urine specimen  Please do not eat 10-12 hours before your appointment if you are coming in fasting for labs on lipids, cholesterol, or glucose (sugar).  Pregnant women should follow their Care Team instructions. Water with medications is okay. Do not drink coffee or other fluids.   If you have concerns about taking  your medications, please ask at office or if scheduling via [employer], send a message by clicking on Secure Messaging, Message Your Care Team.             "  Who to contact     If you have questions or need follow up information about today's clinic visit or your schedule please contact [facility] directly at [phone].  Normal or non-critical lab and imaging results will be communicated to you by MyChart, letter or phone within 4 business days after the clinic has received the results. If you do not hear from us within 7 days, please contact the clinic through [employer] or phone. If you have a critical or abnormal lab result, we will notify you by phone as soon as possible.  Submit refill requests through [employer] or call your pharmacy and they will forward the refill request to us. Please allow 3 business days for your refill to be completed.          Additional Information About Your Visit        [employer] Information     [employer] gives you secure access to your electronic health record. If you see a primary care provider, you can also send messages to your care team and make appointments. If you have questions, please call your primary care clinic.  If you do not have a primary care provider, please call [phone] and they will assist you.        Care EveryWhere ID     This is your Care EveryWhere ID. This could be used by other organizations to access your [place] medical records  [id number]        Your Vitals Were     Pulse Temperature Height Pulse Oximetry BMI (Body Mass Index)       108 98.2  F (36.8  C) (Oral) 1.676 m (5' 6\") 99% 26.34 kg/m2        Blood Pressure from Last 3 Encounters:   05/10/17 122/72   05/03/17 120/82   04/28/17 128/72    Weight from Last 3 Encounters:   05/10/17 74 kg (163 lb 3.2 oz)   05/03/17 74.4 kg (164 lb)   04/28/17 73.8 kg (162 lb 9.6 oz)              We Performed the Following     Creatinine        Primary Care Provider Office Phone # Fax #    Preet Syed [phone]       [facility] [address] [facility] [address]        Thank you!     Thank you for choosing " [facility]  for your care. Our goal is always to provide you with excellent care. Hearing back from our patients is one way we can continue to improve our services. Please take a few minutes to complete the written survey that you may receive in the mail after your visit with us. Thank you!             Your Updated Medication List - Protect others around you: Learn how to safely use, store and throw away your medicines at www.disposemymeds.org.          This list is accurate as of: 5/10/17 11:24 AM.  Always use your most recent med list.                   Brand Name Dispense Instructions for use    CITALOPRAM HYDROBROMIDE PO          CONCERTA 54 MG CR tablet   Generic drug:  methylphenidate ER     30 tablet    Take 1 tablet (54 mg) by mouth every morning       HYDROXYZINE HCL PO          mirtazapine 15 MG tablet    REMERON     Take 45 mg by mouth At Bedtime       polyethylene glycol powder    MIRALAX    527 g    17 grams PO TID until loose stool then 17 grams PO daily PRN constipation       PRILOSEC OTC PO      Take 20 mg by mouth

## 2017-05-10 NOTE — NURSING NOTE
ENDOCRINOLOGY INTAKE FORM    Patient Name:  Jonas Hogan  :  1995    Is patient Diabetic?   {YES (EXPLAIN)/NO:441148}  Does patient have non-diabetic or other endocrine issues?  {YES (EXPLAIN)/NO:182114}    Vitals: There were no vitals taken for this visit.  BMI= There is no height or weight on file to calculate BMI.    Flu vaccine:  {YES /NO:465642}  Pneumonia vaccine:  {YES /NO:333375}    Smoking and Alcohol use:  Social History   Substance Use Topics     Smoking status: Never Smoker     Smokeless tobacco: Never Used     Alcohol use No       Foot Exam: {YES /NO:055866}  Eye Exam:  {YES /NO:738529}  Dental Exam:  {YES /NO:065880}  Aspirin Use:  {YES /NO:538754}    Lab Results   Component Value Date    A1C 5.1 2017       No results found for: MICROL  No results found for: MICROALBUMIN    OBESITY CONCERNS:  No ref. provider found       Initial Visit Previous Visit Current Change   Weight       Height       Waist Circumference         Weight at graduation of high school: ***  Diabetes:  {YES (EXPLAIN)/NO:457905}  Sleep Apnea/Snores: {YES (EXPLAIN)/NO:307834}  Hypertension:  {YES (EXPLAIN)/NO:867317}  Hyperlipidemia:  {YES (EXPLAIN)/NO:647540}  Use of steroids:  YES (EXPLAIN)/NO:108992}  Family history of obesity:  {YES (EXPLAIN)/NO:799917}  Diet:  ***  Exercise: {YES (EXPLAIN)/NO:722159}    Staff Signature:  ***

## 2017-05-11 LAB
COLLECT DURATION TIME UR: 24 H
CREAT CL 24H UR+SERPL-VRATE: 168 ML/MIN
CREAT CL/1.73 SQ M ?TM UR+SERPL-ARVRAT: 159 ML/MIN/1.7M2 (ref 110–180)
CREAT SERPL-MCNC: 0.83 MG/DL (ref 0.66–1.25)
CREAT UR-MCNC: 0.83 MG/DL (ref 0.66–1.25)
CREAT UR-MCNC: 33 MG/DL
GFR SERPL CREATININE-BSD FRML MDRD: NORMAL ML/MIN/1.7M2
SPECIMEN VOL UR: 6100 ML

## 2017-05-11 RX ORDER — DESMOPRESSIN ACETATE 4 UG/ML
1 INJECTION, SOLUTION INTRAVENOUS; SUBCUTANEOUS ONCE
Status: CANCELLED
Start: 2017-05-11 | End: 2017-05-11

## 2017-05-11 NOTE — PROGRESS NOTES
Name: Jonas Hogan  Seen for f/u of polydipsia  Chief Complaint   Patient presents with     Consult     polydipsia follow up on labs        HPI:  Jonas Hogan is a 21 year old male who presents for the evaluation of  .  H/o ADD- on methylphenidate- concerta  Started job in oct 2015.  Since Oct 2015- thinks that he is drinking a lot of water about 8-9 L.  Drinking to thirst.  In last clinic visit 24-hour urine collection was obtained which showed about 6 L collection in 24 hours.  Other labs showed normal electrolytes including sodium, creatinine, calcium, prolactin, thyroid function, pituitary labs including ACTH, FSH, LH, insulin growth factor.  Serum cortisol was in normal range 14.1.  Urine osmolarity low-normal at 209 and serum osmolarity 310.  H/o GERD- acid reflux  Not getting worse.  No h/o head trauma.  Not waking up at night to pee. Not waking up at night feeling thirsty.  No change in vision  No HA  H/o migraine in past with tunnel vision X 10 years back  PMH/PSH:  Past Medical History:   Diagnosis Date     ADHD (attention deficit hyperactivity disorder)      Anxiety state, unspecified      Asthma     triggered by seasonal allergies     Polydipsia 5/3/2017     Respiratory syncytial virus (RSV)      Wheezing      Past Surgical History:   Procedure Laterality Date     ENT SURGERY  2011    four wisdom teeth removed     ESOPHAGOSCOPY, GASTROSCOPY, DUODENOSCOPY (EGD), COMBINED N/A 5/13/2016    Procedure: COMBINED ESOPHAGOSCOPY, GASTROSCOPY, DUODENOSCOPY (EGD), BIOPSY SINGLE OR MULTIPLE;  Surgeon: Tristian Mayes MD;  Location:  GI     Family Hx:  Family History   Problem Relation Age of Onset     Allergies Mother      Hypertension Mother      Lipids Mother      Depression Mother      Anxiety Disorder Mother      MENTAL ILLNESS Mother      OCD     Substance Abuse Mother      Allergies Sister      Depression Sister      Anxiety Disorder Sister      Allergies Sister      MENTAL ILLNESS Sister       ADD, reactionary     MENTAL ILLNESS Father      picking, possibly aspergers     CANCER Maternal Grandfather      MENTAL ILLNESS Maternal Grandfather      OCD     MENTAL ILLNESS Paternal Grandmother      hoards     Anxiety Disorder Paternal Grandmother      Suicide Other      Substance Abuse Other      Bipolar Disorder Other      Intellectual Disability (Mental Retardation) Other      Autism Spectrum Disorder Other      Respiratory Sister      asthma     Anxiety Disorder Maternal Aunt      MENTAL ILLNESS Maternal Aunt      OCD     Anxiety Disorder Maternal Uncle      MENTAL ILLNESS Maternal Uncle      OCD     C.A.D. No family hx of      DIABETES No family hx of             Social Hx:  Social History     Social History     Marital status: Single     Spouse name: N/A     Number of children: N/A     Years of education: N/A     Occupational History     Not on file.     Social History Main Topics     Smoking status: Never Smoker     Smokeless tobacco: Never Used     Alcohol use No     Drug use: No     Sexual activity: Not Currently     Partners: Female     Birth control/ protection: Condom     Other Topics Concern      Service No     Blood Transfusions No     Caffeine Concern No     Occupational Exposure No     Hobby Hazards No     Sleep Concern No     Stress Concern No     Weight Concern No     Special Diet No     Back Care No     Exercise No     Seat Belt Yes     Parent/Sibling W/ Cabg, Mi Or Angioplasty Before 65f 55m? Yes     maternal grandfather- 50, cousin born with heart problem     Social History Narrative          MEDICATIONS:  has a current medication list which includes the following prescription(s): polyethylene glycol, omeprazole magnesium, concerta, citalopram hydrobromide, hydroxyzine hcl, and mirtazapine.    ROS     ROS: 10 point ROS neg other than the symptoms noted above in the HPI.    Physical Exam   VS: /72 (BP Location: Left arm, Patient Position: Chair, Cuff Size: Adult Regular)  Pulse  "108  Temp 98.2  F (36.8  C) (Oral)  Ht 1.676 m (5' 6\")  Wt 74 kg (163 lb 3.2 oz)  SpO2 99%  BMI 26.34 kg/m2  GENERAL: AXOX3, NAD, well dressed, answering questions appropriately, appears stated age.  HEENT: No exopthalmous, no proptosis, EOMI, no lig lag, no retraction  NECK: Thyroid normal in size, non tender, no nodules were palpated.  CV: RRR  LUNGS: CTAB  ABDOMEN: +BS  NEUROLOGY: CN grossly intact, no tremors  PSYCH: normal affect and mood    LABS:  Component      Latest Ref Rng & Units 5/3/2017   Sodium      133 - 144 mmol/L 142   Potassium      3.4 - 5.3 mmol/L 4.2   Chloride      94 - 109 mmol/L 105   Carbon Dioxide      20 - 32 mmol/L 26   Anion Gap      3 - 14 mmol/L 11   Glucose      70 - 99 mg/dL 91   Urea Nitrogen      7 - 30 mg/dL 11   Creatinine      0.66 - 1.25 mg/dL 0.76   GFR Estimate      >60 mL/min/1.7m2 >90 . . .   GFR Estimate If Black      >60 mL/min/1.7m2 >90 . . .   Calcium      8.5 - 10.1 mg/dL 9.4       Component      Latest Ref Rng & Units 5/3/2017   Prolactin      2 - 18 ug/L 8   TSH      0.40 - 4.00 mU/L 1.12   FSH      0.7 - 10.8 IU/L 2.9   Ins Growth Factor 1      112 - 316 ng/ml 289   Lutropin      1.5 - 9.3 IU/L 5.8   Adrenal Corticotropin      <47 pg/mL 22   Cortisol Serum      4 - 22 ug/dL 14.1     Component      Latest Ref Rng & Units 5/3/2017   Urine Osmolality      100 - 1200 mmol/kg 209   Osmolality      275 - 295 mmol/kg 310 (H)     All pertinent notes, labs, and images personally reviewed by me.     A/P  Mr.Jackson Hogan is a 21 year old here for the evaluation of:    (R63.1) Polydipsia  (primary encounter diagnosis)  Comment: Differential includes psychogenic polydipsia, diabetes insipidus, nephrogenic DI.  Labs showing normal sodium, serum was moderately and low urine Osmolarity and normal kidney function.  He is drinking about 8-9 L of water every day.  Drinking to thirst.  Will do following lab work up including 24-hour urine collection.  Other labs showed normal " electrolytes including sodium, creatinine, calcium, prolactin, thyroid function, pituitary labs including ACTH, FSH, LH, insulin growth factor.  Serum cortisol was in normal range 14.1.  Urine osmolarity low-normal at 209 and serum osmolarity 310.  -- discussed labs-  Next plan- get water deprivation test.        More than 50% of face to face time spent with Mr. Hogan on counseling / coordinating his care.      All questions were answered.  The patient indicates understanding of the above issues and agrees with the plan set forth.       Follow-up:  After above    Mony Bonilla MD  Endocrinology   [facility]    CC: Preet Syed     Disclaimer: This note consists of symbols derived from keyboarding, dictation and/or voice recognition software. As a result, there may be errors in the script that have gone undetected. Please consider this when interpreting information found in this chart.    Addendum to above note and clinic visit:    Labs reviewed.    See result note/telephone encounter.

## 2017-05-11 NOTE — PROGRESS NOTES
Labs noted. Kidney function normal  I have contacted infusion center at [facility] and they can to water deprivation test.  My staff will be in contact with you to schedule it.

## 2017-05-12 ENCOUNTER — TELEPHONE (OUTPATIENT)
Dept: ENDOCRINOLOGY | Facility: CLINIC | Age: 22
End: 2017-05-12

## 2017-05-12 NOTE — TELEPHONE ENCOUNTER
Per Dr. Bonilla's message to us    Need water deprivation test.        It will be done at [facility].       [phone]       Ask to make clinic appointment and let us know.      Pt called and left message to call us back about getting scheduled for this testing.    Flynn SCHAFER RN

## 2017-06-01 ENCOUNTER — INFUSION THERAPY VISIT (OUTPATIENT)
Dept: INFUSION THERAPY | Facility: CLINIC | Age: 22
End: 2017-06-01
Attending: INTERNAL MEDICINE
Payer: COMMERCIAL

## 2017-06-01 VITALS
TEMPERATURE: 98.1 F | BODY MASS INDEX: 26.07 KG/M2 | HEART RATE: 76 BPM | OXYGEN SATURATION: 99 % | RESPIRATION RATE: 16 BRPM | DIASTOLIC BLOOD PRESSURE: 80 MMHG | WEIGHT: 161.5 LBS | SYSTOLIC BLOOD PRESSURE: 129 MMHG

## 2017-06-01 DIAGNOSIS — R63.1 POLYDIPSIA: Primary | ICD-10-CM

## 2017-06-01 LAB
ANION GAP SERPL CALCULATED.3IONS-SCNC: 10 MMOL/L (ref 3–14)
BUN SERPL-MCNC: 11 MG/DL (ref 7–30)
CHLORIDE SERPL-SCNC: 104 MMOL/L (ref 94–109)
CHLORIDE SERPL-SCNC: 108 MMOL/L (ref 94–109)
CO2 SERPL-SCNC: 25 MMOL/L (ref 20–32)
CO2 SERPL-SCNC: 30 MMOL/L (ref 20–32)
CREAT SERPL-MCNC: 0.72 MG/DL (ref 0.66–1.25)
GFR SERPL CREATININE-BSD FRML MDRD: NORMAL ML/MIN/1.7M2
GLUCOSE SERPL-MCNC: 101 MG/DL (ref 70–99)
OSMOLALITY SERPL: 294 MMOL/KG (ref 275–295)
OSMOLALITY SERPL: 297 MMOL/KG (ref 275–295)
OSMOLALITY SERPL: 298 MMOL/KG (ref 275–295)
OSMOLALITY SERPL: 300 MMOL/KG (ref 275–295)
OSMOLALITY SERPL: 302 MMOL/KG (ref 275–295)
OSMOLALITY UR: 422 MMOL/KG (ref 100–1200)
OSMOLALITY UR: 437 MMOL/KG (ref 100–1200)
OSMOLALITY UR: 448 MMOL/KG (ref 100–1200)
OSMOLALITY UR: 483 MMOL/KG (ref 100–1200)
OSMOLALITY UR: 556 MMOL/KG (ref 100–1200)
OSMOLALITY UR: 608 MMOL/KG (ref 100–1200)
OSMOLALITY UR: 634 MMOL/KG (ref 100–1200)
OSMOLALITY UR: 646 MMOL/KG (ref 100–1200)
OSMOLALITY UR: 713 MMOL/KG (ref 100–1200)
OSMOLALITY UR: 752 MMOL/KG (ref 100–1200)
POTASSIUM SERPL-SCNC: 3.6 MMOL/L (ref 3.4–5.3)
POTASSIUM SERPL-SCNC: 4 MMOL/L (ref 3.4–5.3)
SODIUM SERPL-SCNC: 141 MMOL/L (ref 133–144)
SODIUM SERPL-SCNC: 142 MMOL/L (ref 133–144)
SODIUM SERPL-SCNC: 142 MMOL/L (ref 133–144)
SODIUM SERPL-SCNC: 143 MMOL/L (ref 133–144)
SODIUM SERPL-SCNC: 143 MMOL/L (ref 133–144)

## 2017-06-01 PROCEDURE — 84588 ASSAY OF VASOPRESSIN: CPT | Performed by: INTERNAL MEDICINE

## 2017-06-01 PROCEDURE — 82947 ASSAY GLUCOSE BLOOD QUANT: CPT | Performed by: INTERNAL MEDICINE

## 2017-06-01 PROCEDURE — 83935 ASSAY OF URINE OSMOLALITY: CPT | Mod: 91 | Performed by: INTERNAL MEDICINE

## 2017-06-01 PROCEDURE — 84520 ASSAY OF UREA NITROGEN: CPT | Performed by: INTERNAL MEDICINE

## 2017-06-01 PROCEDURE — 84295 ASSAY OF SERUM SODIUM: CPT | Performed by: INTERNAL MEDICINE

## 2017-06-01 PROCEDURE — 80051 ELECTROLYTE PANEL: CPT | Performed by: INTERNAL MEDICINE

## 2017-06-01 PROCEDURE — 83930 ASSAY OF BLOOD OSMOLALITY: CPT | Performed by: INTERNAL MEDICINE

## 2017-06-01 PROCEDURE — 36415 COLL VENOUS BLD VENIPUNCTURE: CPT | Performed by: INTERNAL MEDICINE

## 2017-06-01 PROCEDURE — 25000125 ZZHC RX 250: Mod: ZF | Performed by: INTERNAL MEDICINE

## 2017-06-01 PROCEDURE — 82565 ASSAY OF CREATININE: CPT | Performed by: INTERNAL MEDICINE

## 2017-06-01 RX ORDER — DESMOPRESSIN ACETATE 4 UG/ML
1 INJECTION, SOLUTION INTRAVENOUS; SUBCUTANEOUS ONCE
Status: COMPLETED | OUTPATIENT
Start: 2017-06-01 | End: 2017-06-01

## 2017-06-01 RX ORDER — DESMOPRESSIN ACETATE 4 UG/ML
1 INJECTION, SOLUTION INTRAVENOUS; SUBCUTANEOUS ONCE
Status: CANCELLED
Start: 2017-06-01 | End: 2017-06-01

## 2017-06-01 RX ADMIN — DESMOPRESSIN ACETATE 1 MCG: 4 INJECTION INTRAVENOUS at 15:08

## 2017-06-01 NOTE — PATIENT INSTRUCTIONS
Dear Jonas Hogan    Thank you for choosing [facility] ([facility]) for your timed test.  The following information is a summary of our appointment as well as important reminders.      We look forward in seeing you on your next appointment here at [facility].  Please don t hesitate to call us at [phone] to reschedule any of your appointments or to speak with one of the [facility] registered nurses.  It was a pleasure taking care of you today.    Sincerely,  Michelle Butler, SANDRA  [facility]  [facility]  [address] [place] [address]  [address]  Phone:  [phone]

## 2017-06-01 NOTE — PROGRESS NOTES
Nursing Note  Jonas Hogan presents [facility] for:   Chief Complaint   Patient presents with     Other     Water Deprivation test     During today's [facility] appointment, orders from Dr. Sykes were completed.  Frequency: once    Progress note:  Patient identification verified by name and date of birth.  Assessment completed.  Vitals recorded in Doc Flowsheets.  Patient was provided with education regarding infusion and possible side effects.  Patient verbalized understanding.     Baseline 0715,  Labs drawn, urine volume 100 ml    0800 urine volume 70 ml    0900 urine 70 ml    1000 urine 100 ml    1100 urine 70 ml Labs drawn    1200 urine 50 ml    1300 urine 70 mls labs drawn    1400 urine 60 mls      1500 urine 50 mls labs drawn    1510 DDAVP given    1600 urine 50 mls    1610 labs drawn    1710 patient d/c in stable condition      Discharge Plan:   Follow up plan of care with: primary medical doctor.  Discharge instructions were reviewed with patient.  Patient/representative verbalized understanding of discharge instructions and all questions answered.  Patient discharged from [facility] in stable condition.    Michelle Butler RN        /78  Pulse 82  Temp 98.1  F (36.7  C) (Oral)  Wt 73.3 kg (161 lb 8 oz)  SpO2 99%  BMI 26.07 kg/m2

## 2017-06-01 NOTE — MR AVS SNAPSHOT
After Visit Summary   6/1/2017    Jonas Hogan    MRN: [id number]           Patient Information     Date Of Birth          1995        Visit Information        Provider Department      6/1/2017 7:00 AM [facility] ATC; [facility] Specialty [facility] Procedure        Today's Diagnoses     Polydipsia    -  1      Care Instructions    Dear Jonas Hogan    Thank you for choosing [facility] ([facility]) for your timed test.  The following information is a summary of our appointment as well as important reminders.      We look forward in seeing you on your next appointment here at [facility].  Please don t hesitate to call us at [phone] to reschedule any of your appointments or to speak with one of the [facility] registered nurses.  It was a pleasure taking care of you today.    Sincerely,  Michelle Butler, RN  [facility]  Specialty Infusion & Procedure Center  [address] [place] [address]  [address]  Phone:  [phone]            Follow-ups after your visit        Who to contact     If you have questions or need follow up information about today's clinic visit or your schedule please contact [facility] SPECIALTY AND PROCEDURE directly at [phone].  Normal or non-critical lab and imaging results will be communicated to you by MyChart, letter or phone within 4 business days after the clinic has received the results. If you do not hear from us within 7 days, please contact the clinic through MyChart or phone. If you have a critical or abnormal lab result, we will notify you by phone as soon as possible.  Submit refill requests through [employer] or call your pharmacy and they will forward the refill request to us. Please allow 3 business days for your refill to be completed.          Additional Information About Your Visit        MyChart Information      [employer] gives you secure access to your electronic health record. If you see a primary care provider, you can also send messages to your care team and make appointments. If you have questions, please call your primary care clinic.  If you do not have a primary care provider, please call [phone] and they will assist you.        Care EveryWhere ID     This is your Care EveryWhere ID. This could be used by other organizations to access your [place] medical records  [id number]        Your Vitals Were     Pulse Temperature Respirations Pulse Oximetry BMI (Body Mass Index)       88 98.1  F (36.7  C) (Oral) 16 99% 26.07 kg/m2        Blood Pressure from Last 3 Encounters:   06/01/17 135/80   05/10/17 122/72   05/03/17 120/82    Weight from Last 3 Encounters:   06/01/17 73.3 kg (161 lb 8 oz)   05/10/17 74 kg (163 lb 3.2 oz)   05/03/17 74.4 kg (164 lb)              We Performed the Following     Arginine vasopressin hormone     Creatinine     Electrolyte panel     Electrolyte panel     Glucose     Osmolality urine     Osmolality urine     Osmolality urine     Osmolality urine     Osmolality urine     Osmolality urine     Osmolality urine     Osmolality urine     Osmolality urine     Osmolality urine     Osmolality urine     Osmolality     Osmolality     Osmolality     Osmolality     Osmolality     Osmolality     Sodium     Sodium     Sodium     Sodium     Urea nitrogen        Primary Care Provider Office Phone # Fax #    Preet Syed [phone]       [facility] [address] [place] [address]        Thank you!     Thank you for choosing [facility] SPECIALTY [facility] PROCEDURE  for your care. Our goal is always to provide you with excellent care. Hearing back from our patients is one way we can continue to improve our services. Please take a few minutes to complete the written survey that you may receive in the mail after your visit with us. Thank you!              Your Updated Medication List - Protect others around you: Learn how to safely use, store and throw away your medicines at www.disposemymeds.org.          This list is accurate as of: 6/1/17  4:59 PM.  Always use your most recent med list.                   Brand Name Dispense Instructions for use    CITALOPRAM HYDROBROMIDE PO          CONCERTA 54 MG CR tablet   Generic drug:  methylphenidate ER     30 tablet    Take 1 tablet (54 mg) by mouth every morning       HYDROXYZINE HCL PO          mirtazapine 15 MG tablet    REMERON     Take 45 mg by mouth At Bedtime       polyethylene glycol powder    MIRALAX    527 g    17 grams PO TID until loose stool then 17 grams PO daily PRN constipation       PRILOSEC OTC PO      Take 20 mg by mouth

## 2017-06-10 LAB — VASOPRESSIN SERPL-MCNC: 2.9 NG/L

## 2017-06-13 ENCOUNTER — MYC MEDICAL ADVICE (OUTPATIENT)
Dept: ENDOCRINOLOGY | Facility: CLINIC | Age: 22
End: 2017-06-13

## 2017-06-15 NOTE — TELEPHONE ENCOUNTER
Dr. Bonilla do you advise on an appointment to follow up on lab results?    Component      Latest Ref Rng & Units 6/1/2017 6/1/2017 6/1/2017 6/1/2017           7:15 AM  7:37 AM  8:00 AM  9:00 AM   Sodium      133 - 144 mmol/L  143     Potassium      3.4 - 5.3 mmol/L  3.6     Chloride      94 - 109 mmol/L  104     Carbon Dioxide      20 - 32 mmol/L  30     Anion Gap      3 - 14 mmol/L  10     Creatinine      0.66 - 1.25 mg/dL  0.72     GFR Estimate      >60 mL/min/1.7m2  >90 . . .     GFR Estimate If Black      >60 mL/min/1.7m2  >90 . . .     Glucose      70 - 99 mg/dL  101 (H)     Urine Osmolality      100 - 1200 mmol/kg 483  422 437   Osmolality      275 - 295 mmol/kg  300 (H)     Urea Nitrogen      7 - 30 mg/dL  11     Vasopressin               Component      Latest Ref Rng & Units 6/1/2017 6/1/2017 6/1/2017 6/1/2017          10:00 AM 11:00 AM 12:00 PM  1:00 PM   Sodium      133 - 144 mmol/L  142  141   Potassium      3.4 - 5.3 mmol/L       Chloride      94 - 109 mmol/L       Carbon Dioxide      20 - 32 mmol/L       Anion Gap      3 - 14 mmol/L       Creatinine      0.66 - 1.25 mg/dL       GFR Estimate      >60 mL/min/1.7m2       GFR Estimate If Black      >60 mL/min/1.7m2       Glucose      70 - 99 mg/dL       Urine Osmolality      100 - 1200 mmol/kg [phone]   Osmolality      275 - 295 mmol/kg  297 (H)  298 (H)   Urea Nitrogen      7 - 30 mg/dL       Vasopressin               Component      Latest Ref Rng & Units 6/1/2017 6/1/2017 6/1/2017           2:00 PM  3:00 PM  4:00 PM   Sodium      133 - 144 mmol/L  143 142   Potassium      3.4 - 5.3 mmol/L   4.0   Chloride      94 - 109 mmol/L   108   Carbon Dioxide      20 - 32 mmol/L   25   Anion Gap      3 - 14 mmol/L      Creatinine      0.66 - 1.25 mg/dL      GFR Estimate      >60 mL/min/1.7m2      GFR Estimate If Black      >60 mL/min/1.7m2      Glucose      70 - 99 mg/dL      Urine Osmolality      100 - 1200 mmol/kg [phone]   Osmolality      753 - 148  mmol/kg  294 302 (H)   Urea Nitrogen      7 - 30 mg/dL      Vasopressin        2.9

## 2017-06-19 NOTE — TELEPHONE ENCOUNTER
Called Jonas.  Not able to reach. Left brief VM.  Will call again tomorrow.  I was out of office so was not able to answer earlier.

## 2017-06-22 NOTE — TELEPHONE ENCOUNTER
Called Jonas.    Discussed labs.    Water restriction.    Has upcoming psych appointment. Advised to let them know.    Please cancel his upcoming appointment 7/19.    The patient indicates understanding of these issues and agrees with the plan.

## 2018-02-20 ENCOUNTER — OFFICE VISIT (OUTPATIENT)
Dept: INTERNAL MEDICINE | Facility: CLINIC | Age: 23
End: 2018-02-20
Payer: COMMERCIAL

## 2018-02-20 VITALS
TEMPERATURE: 98.2 F | BODY MASS INDEX: 26.5 KG/M2 | OXYGEN SATURATION: 100 % | HEART RATE: 107 BPM | WEIGHT: 164.2 LBS | DIASTOLIC BLOOD PRESSURE: 70 MMHG | SYSTOLIC BLOOD PRESSURE: 128 MMHG

## 2018-02-20 DIAGNOSIS — M26.69 TMJ INFLAMMATION: Primary | ICD-10-CM

## 2018-02-20 PROCEDURE — 99214 OFFICE O/P EST MOD 30 MIN: CPT | Performed by: INTERNAL MEDICINE

## 2018-02-20 RX ORDER — CYCLOBENZAPRINE HCL 10 MG
5-10 TABLET ORAL 2 TIMES DAILY PRN
Qty: 30 TABLET | Refills: 0 | Status: SHIPPED | OUTPATIENT
Start: 2018-02-20 | End: 2018-03-18

## 2018-02-20 NOTE — PROGRESS NOTES
SUBJECTIVE:   Jonas Hogan is a 22 year old male who presents to clinic today for the following health issues:    Pt is a 22 year old male who is seen here to day  with c/o pain in Rt  jaw since 6 days.  Pain mainly situated in front of the Rt ear, has pain with biting, chewing, yawning. No ear pain. No URI symptoms, no teeth aches. Regularly sees dentist. Has dentisht appt in 2 wks. Taking on and off OTC advil.. Not sure if he grinds or clenches  teeth .  No h/o injury.      Past Medical History:   Diagnosis Date     ADHD (attention deficit hyperactivity disorder)      Anxiety state, unspecified      Asthma     triggered by seasonal allergies     Polydipsia 5/3/2017     Respiratory syncytial virus (RSV)      Wheezing          Current Outpatient Prescriptions   Medication Sig Dispense Refill     cyclobenzaprine (FLEXERIL) 10 MG tablet Take 0.5-1 tablets (5-10 mg) by mouth 2 times daily as needed for muscle spasms 30 tablet 0     polyethylene glycol (MIRALAX) powder 17 grams PO TID until loose stool then 17 grams PO daily PRN constipation 527 g 0     Omeprazole Magnesium (PRILOSEC OTC PO) Take 20 mg by mouth       CONCERTA (BRAND NAME - NIDA) 54 MG CR tablet Take 1 tablet (54 mg) by mouth every morning 30 tablet 0     CITALOPRAM HYDROBROMIDE PO        HYDROXYZINE HCL PO        mirtazapine (REMERON) 15 MG tablet Take 45 mg by mouth At Bedtime               Ros;  General;negative  ENT: pain rt  jaw, no ear pain  Cvs;negative  Reps;negative    Blood pressure 128/70, pulse 107, temperature 98.2  F (36.8  C), temperature source Oral, weight 164 lb 3.2 oz (74.5 kg), SpO2 100 %.    GENERAL:healthy, alert and no distress  HEENT-pupils equal and reactive to light and accommodation, TMs clear, oropharynx clear, has tenderness on Rt TMJ and rt pterygoid muscles.  NECK: NEGATIVE  RESP: Normal - Clear to auscultation without rales, rhonchi, or wheezing.  CV: regular rate and rhythm     MS: no peripheral edema, no calf  tenderness      ASSESSMENT AND PLAN:    (M26.69) TMJ inflammation  (primary encounter diagnosis)  Comment: explained about the condition  Plan: pt was advised to avoid opening the mouth wide, avoid chewy foods.advised heat prn, OTC Ibuprofen prn, started on  cyclobenzaprine (FLEXERIL) 5-10 MG tablet as directed.explained clearly about the medication,insructions and side effects. Call or return to clinic prn if these symtoms worsen, fail to improve as anticipated, or if new symptoms develop. Will refer to TMJ specialist if symptoms continues.

## 2018-02-20 NOTE — NURSING NOTE
"Chief Complaint   Patient presents with     Jaw Pain     Rt side x's 6 days       Initial /70  Pulse 107  Temp 98.2  F (36.8  C) (Oral)  Wt 164 lb 3.2 oz (74.5 kg)  SpO2 100%  BMI 26.5 kg/m2 Estimated body mass index is 26.5 kg/(m^2) as calculated from the following:    Height as of 5/10/17: 5' 6\" (1.676 m).    Weight as of this encounter: 164 lb 3.2 oz (74.5 kg).  Medication Reconciliation: complete     Yris Mcdonnell CMA      "

## 2018-02-20 NOTE — MR AVS SNAPSHOT
After Visit Summary   2/20/2018    Jonas Hogan    MRN: [id number]           Patient Information     Date Of Birth          1995        Visit Information        Provider Department      2/20/2018 10:20 AM Margaret Chairez MD [facility]        Today's Diagnoses     TMJ inflammation    -  1       Follow-ups after your visit        Who to contact     If you have questions or need follow up information about today's clinic visit or your schedule please contact [facility] directly at [phone].  Normal or non-critical lab and imaging results will be communicated to you by StrongViewhart, letter or phone within 4 business days after the clinic has received the results. If you do not hear from us within 7 days, please contact the clinic through FundedByMet or phone. If you have a critical or abnormal lab result, we will notify you by phone as soon as possible.  Submit refill requests through [facility] or call your pharmacy and they will forward the refill request to us. Please allow 3 business days for your refill to be completed.          Additional Information About Your Visit        MyChart Information     [facility] gives you secure access to your electronic health record. If you see a primary care provider, you can also send messages to your care team and make appointments. If you have questions, please call your primary care clinic.  If you do not have a primary care provider, please call [phone] and they will assist you.        Care EveryWhere ID     This is your Care EveryWhere ID. This could be used by other organizations to access your [facility] medical records  [id number]        Your Vitals Were     Pulse Temperature Pulse Oximetry BMI (Body Mass Index)          107 98.2  F (36.8  C) (Oral) 100% 26.5 kg/m2         Blood Pressure from Last 3 Encounters:   02/20/18 128/70   06/01/17 129/80   05/10/17 122/72    Weight from Last 3 Encounters:   02/20/18 164  lb 3.2 oz (74.5 kg)   06/01/17 161 lb 8 oz (73.3 kg)   05/10/17 163 lb 3.2 oz (74 kg)              Today, you had the following     No orders found for display         Today's Medication Changes          These changes are accurate as of 2/20/18 10:55 AM.  If you have any questions, ask your nurse or doctor.               Start taking these medicines.        Dose/Directions    cyclobenzaprine 10 MG tablet   Commonly known as:  FLEXERIL   Used for:  TMJ inflammation   Started by:  Margaret Chairez MD        Dose:  5-10 mg   Take 0.5-1 tablets (5-10 mg) by mouth 2 times daily as needed for muscle spasms   Quantity:  30 tablet   Refills:  0            Where to get your medicines      These medications were sent to [id number] - [place], [place] - [address] [address]     Phone:  [phone]     cyclobenzaprine 10 MG tablet                Primary Care Provider Office Phone # Fax #    Preet Syed [phone]       [address] [facility] [address]        Equal Access to Services     [facility] AH: Hadii aad ku hadasho Soomaali, waaxda luqadaha, qaybta kaalmada adeegyada, teresita de la torre hayjosen umesh tapia . So [facility] [phone].    ATENCIÓN: Si habla español, tiene a wilder disposición servicios gratuitos de asistencia lingüística. Llame al [phone].    We comply with applicable federal civil rights laws and [place] laws. We do not discriminate on the basis of race, color, national origin, age, disability, sex, sexual orientation, or gender identity.            Thank you!     Thank you for choosing [facility]  for your care. Our goal is always to provide you with excellent care. Hearing back from our patients is one way we can continue to improve our services. Please take a few minutes to complete the written survey that you may receive in the mail after your visit with us. Thank you!             Your  Updated Medication List - Protect others around you: Learn how to safely use, store and throw away your medicines at www.disposemymeds.org.          This list is accurate as of 2/20/18 10:55 AM.  Always use your most recent med list.                   Brand Name Dispense Instructions for use Diagnosis    CITALOPRAM HYDROBROMIDE PO           CONCERTA 54 MG CR tablet   Generic drug:  methylphenidate ER     30 tablet    Take 1 tablet (54 mg) by mouth every morning    Attention deficit hyperactivity disorder (ADHD), combined type       cyclobenzaprine 10 MG tablet    FLEXERIL    30 tablet    Take 0.5-1 tablets (5-10 mg) by mouth 2 times daily as needed for muscle spasms    TMJ inflammation       HYDROXYZINE HCL PO           mirtazapine 15 MG tablet    REMERON     Take 45 mg by mouth At Bedtime        polyethylene glycol powder    MIRALAX    527 g    17 grams PO TID until loose stool then 17 grams PO daily PRN constipation        PRILOSEC OTC PO      Take 20 mg by mouth

## 2018-03-18 DIAGNOSIS — M26.69 TMJ INFLAMMATION: ICD-10-CM

## 2018-03-20 NOTE — TELEPHONE ENCOUNTER
This med was prescribed for TMJ inflammation 1 mth ago, if he continues to have pain then he should sees TMJ specialist.  Pl inform pt

## 2018-03-20 NOTE — TELEPHONE ENCOUNTER
Cyclobenzaprine       Last Written Prescription Date:  2/20/18  Last Fill Quantity: 30,   # refills: 0    Last Office Visit: 2/20/18    Future Office visit:       Routing refill request to provider for review/approval because:  Drug not on the [facility], P or [facility] refill protocol or controlled substance

## 2018-03-21 NOTE — TELEPHONE ENCOUNTER
Attempted to contact patient, no answer, left voice message to call back.    Per dictation from 2/20/18:  Will refer to TMJ specialist if symptoms continues.

## 2018-03-23 RX ORDER — CYCLOBENZAPRINE HCL 10 MG
TABLET ORAL
Qty: 30 TABLET | Refills: 0 | Status: SHIPPED | OUTPATIENT
Start: 2018-03-23 | End: 2022-12-29

## 2018-04-05 ENCOUNTER — TRANSFERRED RECORDS (OUTPATIENT)
Dept: HEALTH INFORMATION MANAGEMENT | Facility: CLINIC | Age: 23
End: 2018-04-05

## 2019-05-30 ENCOUNTER — TRANSFERRED RECORDS (OUTPATIENT)
Dept: HEALTH INFORMATION MANAGEMENT | Facility: CLINIC | Age: 24
End: 2019-05-30

## 2019-11-07 ENCOUNTER — HEALTH MAINTENANCE LETTER (OUTPATIENT)
Age: 24
End: 2019-11-07

## 2020-11-29 ENCOUNTER — HEALTH MAINTENANCE LETTER (OUTPATIENT)
Age: 25
End: 2020-11-29

## 2021-09-25 ENCOUNTER — HEALTH MAINTENANCE LETTER (OUTPATIENT)
Age: 26
End: 2021-09-25

## 2021-11-01 ENCOUNTER — TRANSFERRED RECORDS (OUTPATIENT)
Dept: HEALTH INFORMATION MANAGEMENT | Facility: CLINIC | Age: 26
End: 2021-11-01
Payer: COMMERCIAL

## 2021-11-01 LAB — PHQ9 SCORE: 3

## 2022-01-09 ENCOUNTER — HEALTH MAINTENANCE LETTER (OUTPATIENT)
Age: 27
End: 2022-01-09

## 2022-12-29 ENCOUNTER — OFFICE VISIT (OUTPATIENT)
Dept: INTERNAL MEDICINE | Facility: CLINIC | Age: 27
End: 2022-12-29
Payer: COMMERCIAL

## 2022-12-29 VITALS
HEIGHT: 66 IN | DIASTOLIC BLOOD PRESSURE: 83 MMHG | OXYGEN SATURATION: 98 % | BODY MASS INDEX: 25.62 KG/M2 | TEMPERATURE: 99.1 F | HEART RATE: 110 BPM | SYSTOLIC BLOOD PRESSURE: 128 MMHG | WEIGHT: 159.4 LBS | RESPIRATION RATE: 14 BRPM

## 2022-12-29 DIAGNOSIS — Z78.9 VEGAN DIET: ICD-10-CM

## 2022-12-29 DIAGNOSIS — L98.9 SKIN LESION: ICD-10-CM

## 2022-12-29 DIAGNOSIS — Z23 HIGH PRIORITY FOR 2019-NCOV VACCINE: ICD-10-CM

## 2022-12-29 DIAGNOSIS — Z11.3 ROUTINE SCREENING FOR STI (SEXUALLY TRANSMITTED INFECTION): ICD-10-CM

## 2022-12-29 DIAGNOSIS — Z11.4 SCREENING FOR HIV (HUMAN IMMUNODEFICIENCY VIRUS): ICD-10-CM

## 2022-12-29 DIAGNOSIS — Z00.00 ROUTINE GENERAL MEDICAL EXAMINATION AT A HEALTH CARE FACILITY: Primary | ICD-10-CM

## 2022-12-29 DIAGNOSIS — Z11.59 NEED FOR HEPATITIS C SCREENING TEST: ICD-10-CM

## 2022-12-29 LAB
ALBUMIN SERPL BCG-MCNC: 5.3 G/DL (ref 3.5–5.2)
ALP SERPL-CCNC: 73 U/L (ref 40–129)
ALT SERPL W P-5'-P-CCNC: 24 U/L (ref 10–50)
ANION GAP SERPL CALCULATED.3IONS-SCNC: 12 MMOL/L (ref 7–15)
AST SERPL W P-5'-P-CCNC: 26 U/L (ref 10–50)
BILIRUB SERPL-MCNC: 0.6 MG/DL
BUN SERPL-MCNC: 13.7 MG/DL (ref 6–20)
CALCIUM SERPL-MCNC: 10.6 MG/DL (ref 8.6–10)
CHLORIDE SERPL-SCNC: 100 MMOL/L (ref 98–107)
CHOLEST SERPL-MCNC: 280 MG/DL
CREAT SERPL-MCNC: 0.86 MG/DL (ref 0.67–1.17)
DEPRECATED HCO3 PLAS-SCNC: 28 MMOL/L (ref 22–29)
ERYTHROCYTE [DISTWIDTH] IN BLOOD BY AUTOMATED COUNT: 11 % (ref 10–15)
GFR SERPL CREATININE-BSD FRML MDRD: >90 ML/MIN/1.73M2
GLUCOSE SERPL-MCNC: 101 MG/DL (ref 70–99)
HCT VFR BLD AUTO: 52.4 % (ref 40–53)
HDLC SERPL-MCNC: 56 MG/DL
HGB BLD-MCNC: 18.2 G/DL (ref 13.3–17.7)
LDLC SERPL CALC-MCNC: 186 MG/DL
MCH RBC QN AUTO: 30.5 PG (ref 26.5–33)
MCHC RBC AUTO-ENTMCNC: 34.7 G/DL (ref 31.5–36.5)
MCV RBC AUTO: 88 FL (ref 78–100)
NONHDLC SERPL-MCNC: 224 MG/DL
PLATELET # BLD AUTO: 377 10E3/UL (ref 150–450)
POTASSIUM SERPL-SCNC: 4.6 MMOL/L (ref 3.4–5.3)
PROT SERPL-MCNC: 8.3 G/DL (ref 6.4–8.3)
RBC # BLD AUTO: 5.97 10E6/UL (ref 4.4–5.9)
SODIUM SERPL-SCNC: 140 MMOL/L (ref 136–145)
TRIGL SERPL-MCNC: 192 MG/DL
VIT B12 SERPL-MCNC: 681 PG/ML (ref 232–1245)
WBC # BLD AUTO: 5 10E3/UL (ref 4–11)

## 2022-12-29 PROCEDURE — 91313 COVID-19 VACCINE BIVALENT BOOSTER 18+ (MODERNA): CPT

## 2022-12-29 PROCEDURE — 85027 COMPLETE CBC AUTOMATED: CPT

## 2022-12-29 PROCEDURE — 80061 LIPID PANEL: CPT

## 2022-12-29 PROCEDURE — 87491 CHLMYD TRACH DNA AMP PROBE: CPT

## 2022-12-29 PROCEDURE — 80053 COMPREHEN METABOLIC PANEL: CPT

## 2022-12-29 PROCEDURE — 87389 HIV-1 AG W/HIV-1&-2 AB AG IA: CPT

## 2022-12-29 PROCEDURE — 87591 N.GONORRHOEAE DNA AMP PROB: CPT

## 2022-12-29 PROCEDURE — 86803 HEPATITIS C AB TEST: CPT

## 2022-12-29 PROCEDURE — 90715 TDAP VACCINE 7 YRS/> IM: CPT

## 2022-12-29 PROCEDURE — 99385 PREV VISIT NEW AGE 18-39: CPT | Mod: 25

## 2022-12-29 PROCEDURE — 90471 IMMUNIZATION ADMIN: CPT

## 2022-12-29 PROCEDURE — 0134A COVID-19 VACCINE BIVALENT BOOSTER 18+ (MODERNA): CPT

## 2022-12-29 PROCEDURE — 36415 COLL VENOUS BLD VENIPUNCTURE: CPT

## 2022-12-29 PROCEDURE — 82607 VITAMIN B-12: CPT

## 2022-12-29 PROCEDURE — 86780 TREPONEMA PALLIDUM: CPT

## 2022-12-29 RX ORDER — PILOCARPINE HYDROCHLORIDE 5 MG/1
5 TABLET, FILM COATED ORAL 2 TIMES DAILY
COMMUNITY

## 2022-12-29 RX ORDER — VITAMIN B COMPLEX
25 TABLET ORAL DAILY
COMMUNITY

## 2022-12-29 ASSESSMENT — ENCOUNTER SYMPTOMS
HEADACHES: 0
HEMATOCHEZIA: 0
DIARRHEA: 0
MYALGIAS: 1
COUGH: 0
FREQUENCY: 0
CONSTIPATION: 0
PALPITATIONS: 0
PARESTHESIAS: 0
JOINT SWELLING: 1
HEARTBURN: 0
WEAKNESS: 0
FEVER: 0
NERVOUS/ANXIOUS: 0
HEMATURIA: 0
ABDOMINAL PAIN: 0
SORE THROAT: 0
CHILLS: 0
DYSURIA: 0
ARTHRALGIAS: 1
SHORTNESS OF BREATH: 0
NAUSEA: 0
EYE PAIN: 0
DIZZINESS: 0

## 2022-12-29 NOTE — PROGRESS NOTES
SUBJECTIVE:   CC: Jonas is an 27 year old who presents for preventative health visit.   Patient has been advised of split billing requirements and indicates understanding: Yes     Healthy Habits:     Getting at least 3 servings of Calcium per day:  Yes    Bi-annual eye exam:  NO    Dental care twice a year:  Yes    Sleep apnea or symptoms of sleep apnea:  None    Diet:  Vegetarian/vegan    Frequency of exercise:  4-5 days/week    Duration of exercise:  30-45 minutes    Taking medications regularly:  Yes    Medication side effects:  Other    PHQ-2 Total Score: 0    Additional concerns today:  Yes      Today's PHQ-2 Score:   PHQ-2 ( 1999 Pfizer) 12/29/2022   Q1: Little interest or pleasure in doing things 0   Q2: Feeling down, depressed or hopeless 0   PHQ-2 Score 0   Q1: Little interest or pleasure in doing things Not at all   Q2: Feeling down, depressed or hopeless Not at all   PHQ-2 Score 0       Have you ever done Advance Care Planning? (For example, a Health Directive, POLST, or a discussion with a medical provider or your loved ones about your wishes): No, advance care planning information given to patient to review.  Advanced care planning was discussed at today's visit.    Social History     Tobacco Use     Smoking status: Never     Smokeless tobacco: Never   Substance Use Topics     Alcohol use: No     If you drink alcohol do you typically have >3 drinks per day or >7 drinks per week? No    Alcohol Use 12/29/2022   Prescreen: >3 drinks/day or >7 drinks/week? Not Applicable   Prescreen: >3 drinks/day or >7 drinks/week? -   No flowsheet data found.    Last PSA: No results found for: PSA    Reviewed orders with patient. Reviewed health maintenance and updated orders accordingly - Yes    Reviewed and updated as needed this visit by clinical staff      Reviewed and updated as needed this visit by Provider                 Past Medical History:   Diagnosis Date     ADHD (attention deficit hyperactivity disorder)   "    Anxiety state, unspecified      Asthma     triggered by seasonal allergies     Depressive disorder 2003     Gastroesophageal reflux disease without esophagitis      Polydipsia 05/03/2017     Respiratory syncytial virus (RSV)      TMJ (temporomandibular joint syndrome)      Wheezing       Past Surgical History:   Procedure Laterality Date     ENT SURGERY  2011    four wisdom teeth removed     ESOPHAGOSCOPY, GASTROSCOPY, DUODENOSCOPY (EGD), COMBINED N/A 5/13/2016    Procedure: COMBINED ESOPHAGOSCOPY, GASTROSCOPY, DUODENOSCOPY (EGD), BIOPSY SINGLE OR MULTIPLE;  Surgeon: Tristian Mayes MD;  Location:  GI       Review of Systems   Constitutional: Negative for chills and fever.   HENT: Negative for congestion, ear pain, hearing loss and sore throat.    Eyes: Negative for pain and visual disturbance.   Respiratory: Negative for cough and shortness of breath.    Cardiovascular: Negative for chest pain, palpitations and peripheral edema.   Gastrointestinal: Negative for abdominal pain, constipation, diarrhea, heartburn, hematochezia and nausea.   Genitourinary: Negative for dysuria, frequency, genital sores, hematuria, impotence, penile discharge and urgency.   Musculoskeletal: Positive for arthralgias, joint swelling and myalgias.   Skin: Negative for rash.   Neurological: Negative for dizziness, weakness, headaches and paresthesias.   Psychiatric/Behavioral: Negative for mood changes. The patient is not nervous/anxious.        Had a vegan meal supplement this morning    Growth on skin started in 2015 and is not sure if he had this looked at previously. Has not changed over, does not hurt, and is not itchy. It is slightly raised, purplish color    Patient has been been on a vegan diet for about 3-4 years    OBJECTIVE:   /83 (BP Location: Left arm, Patient Position: Sitting, Cuff Size: Adult Large)   Pulse 115   Temp 99.1  F (37.3  C) (Tympanic)   Resp 14   Ht 1.664 m (5' 5.5\")   Wt 72.3 kg (159 lb 6.4 " oz)   SpO2 98%   BMI 26.12 kg/m      Physical Exam  GENERAL: alert and no distress  EYES: Eyes grossly normal to inspection, PERRL and conjunctivae and sclerae normal  HENT: ear canals and TM's normal, nose and mouth without ulcers or lesions  NECK: no adenopathy, no asymmetry, masses, or scars and thyroid normal to palpation  RESP: lungs clear to auscultation - no rales, rhonchi or wheezes  CV: regular rate and rhythm, normal S1 S2, no S3 or S4, no murmur, click or rub, no peripheral edema and peripheral pulses strong  ABDOMEN: soft, nontender, no hepatosplenomegaly, no masses and bowel sounds normal  MS: no gross musculoskeletal defects noted, no edema  SKIN: no suspicious lesions or rashes  NEURO: Normal strength and tone, mentation intact and speech normal  PSYCH: mentation appears normal, affect normal/bright    ASSESSMENT/PLAN:   (Z00.00) Routine general medical examination at a health care facility  (primary encounter diagnosis)  Comment: routine physical  Plan: Comprehensive metabolic panel (BMP + Alb, Alk         Phos, ALT, AST, Total. Bili, TP), CBC with         platelets, Lipid panel reflex to direct LDL         Non-fasting            (Z11.4) Screening for HIV (human immunodeficiency virus)  Comment: Once in a lifetime testing   Plan: HIV Antigen Antibody Combo            (Z11.59) Need for hepatitis C screening test  Comment: Once in a lifetime testing   Plan: Hepatitis C Screen Reflex to HCV RNA Quant and         Genotype            (L98.9) Skin lesion  Comment: skin lesion has not changed, and is not bothing patient. Will refer to derm to assess for possible biopsy/removal  Plan: Adult Dermatology Referral            (Z11.3) Routine screening for STI (sexually transmitted infection)  Comment: routine  Plan: Chlamydia trachomatis PCR, Treponema Abs w         Reflex to RPR and Titer, Neisseria gonorrhoeae         PCR            (Z78.9) Vegan diet  Comment: will check B12- has had vegan diet for  "3-4  Plan: Vitamin B12            (Z23) High priority for 2019-nCoV vaccine  Comment: noted  Plan: COVID-19,PF,MODERNA BIVALENT 18+Yrs      COUNSELING:   Reviewed preventive health counseling, as reflected in patient instructions      BMI:   Estimated body mass index is 26.12 kg/m  as calculated from the following:    Height as of this encounter: 1.664 m (5' 5.5\").    Weight as of this encounter: 72.3 kg (159 lb 6.4 oz).         He reports that he has never smoked. He has never used smokeless tobacco.      Lewis Alba NP  [facility]  "

## 2022-12-29 NOTE — NURSING NOTE
Prior to injection, verified patient identity using patient's name and date of birth.  Due to injection administration, patient instructed to remain in clinic for 15 minutes afterwards, and to report any adverse reaction to me or the  staff immediately.        Drug Amount Wasted:  None.  Vial/Syringe: Syringe  Expiration Date:  12/6/24    Screening Questionnaire for Adult Immunization     Are you sick today?   No    Do you have allergies to medications, food or any vaccine?   No    Have you ever had a serious reaction after receiving a vaccination?   No    Do you have a long-term health problem with heart disease, lung disease,  asthma, kidney disease, diabetes, anemia, metabolic or blood disease?   No    Do you have cancer, leukemia, AIDS, or any immune system problem?   No    Do you take cortisone, prednisone, other steroids, or anticancer drugs, or  have you had any x-ray (radiation) treatments?   No    Have you had a seizure, brain, or other nervous system problem?   No    During the past year, have you received a transfusion of blood or blood       products, or been given a medicine called immune (gamma) globulin?   No    For women: Are you pregnant or is there a chance you could become         pregnant during the next month?   No    Have you received any vaccinations in the past 4 weeks?   No     Immunization questionnaire answers were all negative.      MNVFC doesn't apply on this patient     Screening performed by More Lacey MA on 12/29/2022 at 8:31 AM.

## 2022-12-30 LAB
C TRACH DNA SPEC QL NAA+PROBE: NEGATIVE
HCV AB SERPL QL IA: NONREACTIVE
HIV 1+2 AB+HIV1 P24 AG SERPL QL IA: NONREACTIVE
N GONORRHOEA DNA SPEC QL NAA+PROBE: NEGATIVE
T PALLIDUM AB SER QL: NONREACTIVE

## 2023-01-03 DIAGNOSIS — R79.89 ABNORMAL CBC: Primary | ICD-10-CM

## 2023-01-03 DIAGNOSIS — E83.52 HYPERCALCEMIA: ICD-10-CM

## 2023-02-15 ENCOUNTER — LAB (OUTPATIENT)
Dept: LAB | Facility: CLINIC | Age: 28
End: 2023-02-15
Payer: COMMERCIAL

## 2023-02-15 DIAGNOSIS — E83.52 HYPERCALCEMIA: ICD-10-CM

## 2023-02-15 DIAGNOSIS — R79.89 ABNORMAL CBC: ICD-10-CM

## 2023-02-15 LAB
CA-I BLD-MCNC: 4.9 MG/DL (ref 4.4–5.2)
ERYTHROCYTE [DISTWIDTH] IN BLOOD BY AUTOMATED COUNT: 11.4 % (ref 10–15)
HCT VFR BLD AUTO: 48.9 % (ref 40–53)
HGB BLD-MCNC: 16.6 G/DL (ref 13.3–17.7)
MCH RBC QN AUTO: 30.2 PG (ref 26.5–33)
MCHC RBC AUTO-ENTMCNC: 33.9 G/DL (ref 31.5–36.5)
MCV RBC AUTO: 89 FL (ref 78–100)
PLATELET # BLD AUTO: 312 10E3/UL (ref 150–450)
RBC # BLD AUTO: 5.5 10E6/UL (ref 4.4–5.9)
WBC # BLD AUTO: 4 10E3/UL (ref 4–11)

## 2023-02-15 PROCEDURE — 36415 COLL VENOUS BLD VENIPUNCTURE: CPT

## 2023-02-15 PROCEDURE — 85027 COMPLETE CBC AUTOMATED: CPT

## 2023-02-15 PROCEDURE — 82330 ASSAY OF CALCIUM: CPT

## 2023-06-16 ENCOUNTER — OFFICE VISIT (OUTPATIENT)
Dept: DERMATOLOGY | Facility: CLINIC | Age: 28
End: 2023-06-16
Payer: COMMERCIAL

## 2023-06-16 DIAGNOSIS — D23.9 DERMATOFIBROMA: Primary | ICD-10-CM

## 2023-06-16 DIAGNOSIS — Q38.6 FORDYCE SPOTS: ICD-10-CM

## 2023-06-16 PROCEDURE — 99203 OFFICE O/P NEW LOW 30 MIN: CPT | Performed by: PHYSICIAN ASSISTANT

## 2023-06-16 NOTE — PROGRESS NOTES
HPI:   Chief complaints: Jonas Hogan is a pleasant 27 year old male who presents for evaluation of several spots of concern. He has a spot on the right lower leg which has been present for years. He has not noticed any recent change with it. He also has bumps beneath both eyes, also present for a long time. Lastly he has a spot on the right cheek he would like checked. No history of skin cancer.       PHYSICAL EXAM:    There were no vitals taken for this visit.  Skin exam performed as follows: Type 2 skin. Mood appropriate  Alert and Oriented X 3. Well developed, well nourished in no distress.  General appearance: Normal  Head including face: Normal  Eyes: conjunctiva and lids: Normal  Mouth: Lips, teeth, gums: Normal  Neck: Normal  Skin: Scalp and body hair: See below    Firm papule with dimple sign on the right lower leg  Numerous tan papules beneath both eyes  Enlarged pore on the right cheek     ASSESSMENT/PLAN:     1. Dermatofibroma on the right lower leg. Advised benign no treatment needed.   2. [place] glands - advised benign no treatment needed  3. Dilated pore on the right cheek - discussed excision if ever bothersome          Follow-up: PRN  CC:   Scribed By: Salome Rothman, MS, PA-C

## 2023-06-16 NOTE — LETTER
6/16/2023         RE: Jonas Hogan  [address] [facility] [address]        Dear Colleague,    Thank you for referring your patient, Jonas Hogan, to the [facility]. Please see a copy of my visit note below.    HPI:   Chief complaints: Jonas Hogan is a pleasant 27 year old male who presents for evaluation of several spots of concern. He has a spot on the right lower leg which has been present for years. He has not noticed any recent change with it. He also has bumps beneath both eyes, also present for a long time. Lastly he has a spot on the right cheek he would like checked. No history of skin cancer.       PHYSICAL EXAM:    There were no vitals taken for this visit.  Skin exam performed as follows: Type 2 skin. Mood appropriate  Alert and Oriented X 3. Well developed, well nourished in no distress.  General appearance: Normal  Head including face: Normal  Eyes: conjunctiva and lids: Normal  Mouth: Lips, teeth, gums: Normal  Neck: Normal  Skin: Scalp and body hair: See below    Firm papule with dimple sign on the right lower leg  Numerous tan papules beneath both eyes  Enlarged pore on the right cheek     ASSESSMENT/PLAN:     1. Dermatofibroma on the right lower leg. Advised benign no treatment needed.   2. [place] glands - advised benign no treatment needed  3. Dilated pore on the right cheek - discussed excision if ever bothersome          Follow-up: PRN  CC:   Scribed By: Salome Rothman MS, PAEZEQUIEL          Again, thank you for allowing me to participate in the care of your patient.        Sincerely,        Salome Rothman PA-C

## 2023-11-20 ENCOUNTER — TRANSFERRED RECORDS (OUTPATIENT)
Dept: HEALTH INFORMATION MANAGEMENT | Facility: CLINIC | Age: 28
End: 2023-11-20
Payer: COMMERCIAL

## 2024-02-04 ENCOUNTER — HEALTH MAINTENANCE LETTER (OUTPATIENT)
Age: 29
End: 2024-02-04

## 2025-03-02 ENCOUNTER — HEALTH MAINTENANCE LETTER (OUTPATIENT)
Age: 30
End: 2025-03-02